# Patient Record
Sex: FEMALE | Race: WHITE | NOT HISPANIC OR LATINO | Employment: OTHER | URBAN - METROPOLITAN AREA
[De-identification: names, ages, dates, MRNs, and addresses within clinical notes are randomized per-mention and may not be internally consistent; named-entity substitution may affect disease eponyms.]

---

## 2017-01-17 ENCOUNTER — TRANSCRIBE ORDERS (OUTPATIENT)
Dept: LAB | Facility: CLINIC | Age: 27
End: 2017-01-17

## 2017-10-01 ENCOUNTER — OFFICE VISIT (OUTPATIENT)
Dept: URGENT CARE | Facility: CLINIC | Age: 27
End: 2017-10-01
Payer: COMMERCIAL

## 2017-10-01 PROCEDURE — 99213 OFFICE O/P EST LOW 20 MIN: CPT

## 2017-10-03 NOTE — PROGRESS NOTES
Assessment  1  History of sinusitis (V12 69) (Z87 09)   2  Sinusitis (473 9) (J32 9)    Plan  Sinusitis    · Amoxicillin-Pot Clavulanate 875-125 MG Oral Tablet (Augmentin); TAKE 1 TABLET  EVERY 12 HOURS DAILY    Discussion/Summary  Discussion Summary:   Discussed dx of sinsuitis will treat with augmentin education to use secondary method of birth control while on antibiotic and instructed to follow up with PCP in 1-2 days  Medication Side Effects Reviewed: Possible side effects of new medications were reviewed with the patient/guardian today  Understands and agrees with treatment plan: The treatment plan was reviewed with the patient/guardian  The patient/guardian understands and agrees with the treatment plan   Counseling Documentation With Imm: The patient was counseled regarding instructions for management,-patient and family education,-importance of compliance with treatment  total time of encounter was 25 [id number] minutes was spent counseling  Follow Up Instructions: Follow Up with your Primary Care Provider in 1-2 days  If your symptoms worsen, go to the nearest [place] [address] Emergency Department  Chief Complaint  1  Cold Symptoms  Chief Complaint Free Text Note Form: c/o facial sinus pressure and post nasal drip x 5 days      History of Present Illness  HPI: 27year old fmeale at [facility] today with chief complaint of sinus pressure and congestion for 5 days denies nay fever chills   Hospital Based Practices Required Assessment:   Pain Assessment   the patient states they do not have pain  The pain is located in the facial  (on a scale of 0 to 10, the patient rates the pain at 8 )   Abuse And Domestic Violence Screen    Yes, the patient is safe at home -The patient states no one is hurting them  Depression And Suicide Screen  No, the patient has not had thoughts of hurting themself  No, the patient has not felt depressed in the past 7 days  Readiness To Learn: Receptive     Barriers To Learning: none  Preferred Learning: verbal   Education Completed: disease/condition,-medications-and-further treatment/follow-up   Teaching Method: verbal   Person Taught: patient   Evaluation Of Learning: verbalized/demonstrated understanding      Review of Systems  Focused-Female:   Constitutional: No fever, no chills, feels well, no tiredness, no recent weight gain or loss  ENT: nasal discharge, but-as noted in HPI  Cardiovascular: no complaints of slow or fast heart rate, no chest pain, no palpitations, no leg claudication or lower extremity edema  Respiratory: PND, but-as noted in HPI  Breasts: no complaints of breast pain, breast lump or nipple discharge  Gastrointestinal: no complaints of abdominal pain, no constipation, no nausea or diarrhea, no vomiting, no bloody stools  Genitourinary: no complaints of dysuria, no incontinence, no pelvic pain, no dysmenorrhea, no vaginal discharge or abnormal vaginal bleeding  Musculoskeletal: no complaints of arthralgia, no myalgia, no joint swelling or stiffness, no limb pain or swelling  Integumentary: no complaints of skin rash or lesion, no itching or dry skin, no skin wounds  Neurological: no complaints of headache, no confusion, no numbness or tingling, no dizziness or fainting  ROS Reviewed:   ROS reviewed  Active Problems  1  Fatigue (780 79) (R53 83)   2  Need for HPV vaccination (V04 89) (Z23)   3  Need for Tdap vaccination (V06 1) (Z23)   4  Oral contraceptive use (V25 41) (Z30 41)    Past Medical History  1  History of Acute tonsillitis (463) (J03 90)   2  History of sinusitis (V12 69) (Z87 09)   3  History of tinea corporis (V12 09) (Z86 19)   4  No pertinent past medical history  Active Problems And Past Medical History Reviewed: The active problems and past medical history were reviewed and updated today  Family History  Family History    1  Family history of cardiac disorder (V17 49) (Z82 49)   2   Family history of cerebrovascular accident (V17 1) (Z82 3)   3  Family history of diabetes mellitus (V18 0) (Z83 3)   4  Family history of hypertension (V17 49) (Z82 49)   5  Family history of malignant neoplasm (V16 9) (Z80 9)  Family History Reviewed: The family history was reviewed and updated today  Social History   · Occasional tobacco smoker (305 1) (Z72 0)   · Oral contraceptive use (V25 41) (Z30 41)  Social History Reviewed: The social history was reviewed and updated today  The social history was reviewed and is unchanged  Surgical History  1  History of Oral Surgery Tooth Extraction  Surgical History Reviewed: The surgical history was reviewed and updated today  Current Meds   1  Multiple Vitamins Oral Tablet; Take 1 daily; Therapy: 40VIU8925 to (Last BS:[id number])  Requested for: 55FQC1366 Ordered  Medication List Reviewed: The medication list was reviewed and updated today  Allergies  1  No Known Drug Allergies  2  Dust   3  Other   4  Shellfish    Vitals  Signs   Recorded: 67DXM5610 10:10AM   Temperature: 98 1 F  Heart Rate: 79  Respiration: 18  Systolic: 510  Diastolic: 78  Height: 5 ft 4 in  Weight: 119 lb   BMI Calculated: 20 43  BSA Calculated: 1 57  O2 Saturation: 98, RA  Pain Scale: 8    Physical Exam    Constitutional   General appearance: No acute distress, well appearing and well nourished  Eyes   Conjunctiva and lids: No swelling, erythema or discharge  Pupils and irises: Equal, round and reactive to light  Ears, Nose, Mouth, and Throat   External inspection of ears and nose: Normal     Otoscopic examination: Tympanic membranes translucent with normal light reflex  Canals patent without erythema  Nasal mucosa, septum, and turbinates: Abnormal   normal nasal septum,-no intranasal masses or polyps-and-normal nasal turbinates  There was a purulent discharge from both nares  The bilateral nasal mucosa was boggy-and-edematous  Oropharynx: Abnormal  -PND     Pulmonary Respiratory effort: No increased work of breathing or signs of respiratory distress  Auscultation of lungs: Clear to auscultation  Cardiovascular   Palpation of heart: Normal PMI, no thrills  Auscultation of heart: Normal rate and rhythm, normal S1 and S2, without murmurs  Lymphatic   Palpation of lymph nodes in neck: No lymphadenopathy      Musculoskeletal   Gait and station: Normal     Psychiatric   Orientation to person, place, and time: Normal     Mood and affect: Normal        Signatures   Electronically signed by : Ronn Reyez NP; Oct  1 2017 10:26AM EST                       (Author)    Electronically signed by : KELVIN Root ; Oct  2 2017 12:09PM EST                       (Co-author)

## 2017-11-06 ENCOUNTER — OFFICE VISIT (OUTPATIENT)
Dept: URGENT CARE | Facility: CLINIC | Age: 27
End: 2017-11-06
Payer: COMMERCIAL

## 2017-11-06 PROCEDURE — 99213 OFFICE O/P EST LOW 20 MIN: CPT

## 2017-11-08 NOTE — PROGRESS NOTES
Assessment  1  Acute frontal sinusitis (461 1) (J01 10)    Plan  Acute frontal sinusitis    · Azithromycin 250 MG Oral Tablet (Zithromax Z-Juan); TAKE 2 TABLETS ON DAY 1  THEN TAKE 1 TABLET A DAY FOR 4 DAYS    Discussion/Summary  Discussion Summary:   Discussed dx of sinusitis and will treat with zithromax and follow up with PCP in 1-2 days  Medication Side Effects Reviewed: Possible side effects of new medications were reviewed with the patient/guardian today  Understands and agrees with treatment plan: The treatment plan was reviewed with the patient/guardian  The patient/guardian understands and agrees with the treatment plan   Counseling Documentation With Imm: The patient was counseled regarding instructions for management,-- patient and family education,-- importance of compliance with treatment  total time of encounter was 25 minutes-- and-- 10 minutes was spent counseling  Follow Up Instructions: Follow Up with your Primary Care Provider in 1-2 days  If your symptoms worsen, go to the nearest [place] [address] Emergency Department  Chief Complaint  1  Sore Throat  Chief Complaint Free Text Note Form: C/O sore throat and post nasal drip since yesterday  History of Present Illness  HPI: 32year old female at urgent care today with chief complaint of sinus congestion PND and sore throat for 2 days  Has not used nay OTC medications   Hospital Based Practices Required Assessment:   Pain Assessment   the patient states they have pain  The pain is located in the throat  The patient describes the pain as aching  (on a scale of 0 to 10, the patient rates the pain at 3 )   Abuse And Domestic Violence Screen    Yes, the patient is safe at home  -- The patient states no one is hurting them  Depression And Suicide Screen  No, the patient has not had thoughts of hurting themself  No, the patient has not felt depressed in the past 7 days  Readiness To Learn: Receptive  Barriers To Learning: none  Preferred Learning: verbal   Education Completed: disease/condition,-- medications-- and-- further treatment/follow-up   Teaching Method: verbal   Person Taught: patient   Evaluation Of Learning: verbalized/demonstrated understanding   Sore Throat: Susy Weinberg presents with complaints of sore throat  Associated symptoms include nasal congestion-- and-- postnasal drainage, but-- no dysphagia,-- no odynophagia,-- no swollen glands,-- no myalgias,-- no drooling,-- no stridor,-- no fever,-- no chills,-- no headache,-- no hoarseness,-- no neck stiffness,-- no ear pain,-- no facial pain,-- no abdominal pain,-- no nausea,-- no vomiting,-- no cough,-- no rash,-- no anorexia-- and-- no fatigue  Review of Systems  Focused-Female:   Constitutional: No fever, no chills, feels well, no tiredness, no recent weight gain or loss  ENT: sore throat-- and-- nasal discharge, but-- as noted in HPI  Cardiovascular: no complaints of slow or fast heart rate, no chest pain, no palpitations, no leg claudication or lower extremity edema  Respiratory: PND, but-- as noted in HPI  Breasts: no complaints of breast pain, breast lump or nipple discharge  Gastrointestinal: no complaints of abdominal pain, no constipation, no nausea or diarrhea, no vomiting, no bloody stools  Genitourinary: no complaints of dysuria, no incontinence, no pelvic pain, no dysmenorrhea, no vaginal discharge or abnormal vaginal bleeding  Musculoskeletal: no complaints of arthralgia, no myalgia, no joint swelling or stiffness, no limb pain or swelling  Integumentary: no complaints of skin rash or lesion, no itching or dry skin, no skin wounds  Neurological: no complaints of headache, no confusion, no numbness or tingling, no dizziness or fainting  ROS Reviewed:   ROS reviewed  Active Problems  1  Fatigue (780 79) (R53 83)   2  Need for HPV vaccination (V04 89) (Z23)   3  Need for Tdap vaccination (V06 1) (Z23)   4   Oral contraceptive use (V25 41) (Z30 41)   5  Sinusitis (473 9) (J32 9)    Past Medical History  1  History of Acute tonsillitis (463) (J03 90)   2  History of sinusitis (V12 69) (Z87 09)   3  History of tinea corporis (V12 09) (Z86 19)   4  No pertinent past medical history  Active Problems And Past Medical History Reviewed: The active problems and past medical history were reviewed and updated today  Family History  Family History    1  Family history of cardiac disorder (V17 49) (Z82 49)   2  Family history of cerebrovascular accident (V17 1) (Z82 3)   3  Family history of diabetes mellitus (V18 0) (Z83 3)   4  Family history of hypertension (V17 49) (Z82 49)   5  Family history of malignant neoplasm (V16 9) (Z80 9)  Family History Reviewed: The family history was reviewed and updated today  Social History   · Denied: History of Occasional tobacco smoker   · Oral contraceptive use (V25 41) (Z30 41)  Social History Reviewed: The social history was reviewed and updated today  The social history was reviewed and is unchanged  Surgical History  1  History of Oral Surgery Tooth Extraction  Surgical History Reviewed: The surgical history was reviewed and updated today  Current Meds   1  Multiple Vitamins Oral Tablet; Take 1 daily; Therapy: 19SXC7558 to (Last VZ:[id number])  Requested for: 06QEC3411 Ordered  Medication List Reviewed: The medication list was reviewed and updated today  Allergies  1  No Known Drug Allergies  2  Dust   3  Other   4  Shellfish    Vitals  Signs   Recorded: 35SED1528 12:18PM   Temperature: 97 3 F  Heart Rate: 84  Respiration: 18  Systolic: 927  Diastolic: 72  Height: 5 ft 4 in  Weight: 119 lb   BMI Calculated: 20 43  BSA Calculated: 1 58  O2 Saturation: 99  Pain Scale: 3    Physical Exam    Constitutional   General appearance: No acute distress, well appearing and well nourished  Eyes   Conjunctiva and lids: No swelling, erythema or discharge      Pupils and irises: Equal, round and reactive to light  Ears, Nose, Mouth, and Throat   External inspection of ears and nose: Normal     Otoscopic examination: Tympanic membranes translucent with normal light reflex  Canals patent without erythema  Nasal mucosa, septum, and turbinates: Abnormal   normal nasal septum,-- no intranasal masses or polyps-- and-- normal nasal turbinates  There was a purulent discharge from both nares  The bilateral nasal mucosa was boggy-- and-- edematous  Oropharynx: Abnormal  -- PND  Pulmonary   Respiratory effort: No increased work of breathing or signs of respiratory distress  Auscultation of lungs: Clear to auscultation  Cardiovascular   Palpation of heart: Normal PMI, no thrills  Auscultation of heart: Normal rate and rhythm, normal S1 and S2, without murmurs  Lymphatic   Palpation of lymph nodes in neck: No lymphadenopathy      Musculoskeletal   Gait and station: Normal     Psychiatric   Orientation to person, place, and time: Normal     Mood and affect: Normal        Signatures   Electronically signed by : Lyle Hurley NP; Nov 6 2017 12:41PM EST                       (Author)    Electronically signed by : KELVIN Sullivan ; Nov 7 2017  4:07PM EST                       (Co-author)

## 2018-04-27 ENCOUNTER — OFFICE VISIT (OUTPATIENT)
Dept: URGENT CARE | Facility: CLINIC | Age: 28
End: 2018-04-27
Payer: COMMERCIAL

## 2018-04-27 VITALS
WEIGHT: 125 LBS | DIASTOLIC BLOOD PRESSURE: 76 MMHG | HEART RATE: 88 BPM | TEMPERATURE: 97.8 F | SYSTOLIC BLOOD PRESSURE: 112 MMHG | HEIGHT: 64 IN | RESPIRATION RATE: 16 BRPM | OXYGEN SATURATION: 98 % | BODY MASS INDEX: 21.34 KG/M2

## 2018-04-27 DIAGNOSIS — M54.9 OTHER ACUTE BACK PAIN: Primary | ICD-10-CM

## 2018-04-27 PROCEDURE — 99213 OFFICE O/P EST LOW 20 MIN: CPT | Performed by: PHYSICIAN ASSISTANT

## 2018-04-27 RX ORDER — METHYLPREDNISOLONE 4 MG/1
TABLET ORAL
Qty: 21 TABLET | Refills: 0 | Status: SHIPPED | OUTPATIENT
Start: 2018-04-27 | End: 2019-01-24

## 2018-04-27 NOTE — PATIENT INSTRUCTIONS
Recommend icing the area of injury every 2 hours for 20 30 minutes  Take steroids as directed  Moist warm heat and stretches  If symptoms are not improving over the next 5-7 days, follow up with your PCP or orthopedic doctor

## 2018-04-27 NOTE — PROGRESS NOTES
[address] [facility]    NAME: Harmony Martin is a 32 y o  female  : 1990    MRN: [id number]  DATE: 2018  TIME: 10:46 AM    Assessment and Plan   Other acute back pain [M54 9]  1  Other acute back pain  methylprednisolone (MEDROL) 4 mg tablet    Clinically feel this is all more muscular in nature and muscle spasm muscle strain in the midback  Will treat with prednisone  If not improving use to follow up with PCP  Patient Instructions     Patient Instructions   Recommend icing the area of injury every 2 hours for 20 30 minutes  Take steroids as directed  Moist warm heat and stretches  If symptoms are not improving over the next 5-7 days, follow up with your PCP or orthopedic doctor  Chief Complaint     Chief Complaint   Patient presents with    Back Pain     C/O mid upper back pain x 1 week with no known injury  Pt did drive to [place] and back  Pt noted a swollen lymph node on the left occiptal area last PM and c/o all over body aches  History of Present Illness   [de-identified] year female here with complaint of mid back pain has been there for about a week  She thought that she had pulled something in it just was not getting better  Yesterday noticed some pain in left occiput of her skull  But she felt a bump there that she is unsure if the lymph node or not  Also feels achy all over  Denies any fever chills  No upper respiratory complaints, except for some mild nasal congestion which he relates to her allergies     No cough  Denies any radiation of the pain  Denies any related numbness or tingling or weakness in her extremities  Review of Systems   Review of Systems   Constitutional: Negative for activity change, appetite change, chills, diaphoresis, fatigue, fever and unexpected weight change  HENT: Negative for congestion, dental problem, hearing loss, sinus pressure, sneezing, sore throat, tinnitus, trouble swallowing and voice change      Eyes: Negative for photophobia, redness and visual disturbance  Respiratory: Negative for apnea, cough, chest tightness, shortness of breath, wheezing and stridor  Cardiovascular: Negative for chest pain, palpitations and leg swelling  Gastrointestinal: Negative for abdominal distention, abdominal pain, blood in stool, constipation, diarrhea, nausea and vomiting  Endocrine: Negative for cold intolerance, heat intolerance, polydipsia, polyphagia and polyuria  Genitourinary: Negative for difficulty urinating, dysuria, flank pain, frequency, hematuria and urgency  Musculoskeletal: Positive for back pain  Negative for arthralgias, gait problem, joint swelling, myalgias, neck pain and neck stiffness  Skin: Negative for pallor, rash and wound  Neurological: Negative for dizziness, tremors, seizures, speech difficulty, weakness and headaches  Hematological: Negative for adenopathy  Does not bruise/bleed easily  Psychiatric/Behavioral: Negative for agitation, confusion, dysphoric mood and sleep disturbance  The patient is not nervous/anxious  All other systems reviewed and are negative  Current Medications     Current Outpatient Prescriptions:     MULTIPLE VITAMINS PO, Take by mouth daily, Disp: , Rfl:     methylprednisolone (MEDROL) 4 mg tablet, Medrol dosepak, take as directed, Disp: 21 tablet, Rfl: 0    Current Allergies     Allergies as of 04/27/2018 - Reviewed 04/27/2018   Allergen Reaction Noted    Other  06/18/2014    Shellfish allergy  06/18/2014          The following portions of the patient's history were reviewed and updated as appropriate: allergies, current medications, past family history, past medical history, past social history, past surgical history and problem list    Past Medical History:   Diagnosis Date    Allergic     Dust allergy     Gestational diabetes     Shellfish allergy      History reviewed  No pertinent surgical history  No family history on file    Medications have been verified  Objective   /76   Pulse 88   Temp 97 8 °F (36 6 °C) (Tympanic)   Resp 16   Ht 5' 4" (1 626 m)   Wt 56 7 kg (125 lb)   LMP 04/11/2018   SpO2 98%   BMI 21 46 kg/m²      Physical Exam   Physical Exam   Constitutional: She appears well-developed and well-nourished  No distress  HENT:   Head: Normocephalic  Right Ear: External ear normal    Left Ear: External ear normal    Nose: Nose normal    Mouth/Throat: Oropharynx is clear and moist  No oropharyngeal exudate  Neck: Normal range of motion  Neck supple  Cardiovascular: Normal rate, regular rhythm and normal heart sounds  No murmur heard  Pulmonary/Chest: Effort normal and breath sounds normal  No respiratory distress  She has no wheezes  She has no rales  Abdominal: Soft  Bowel sounds are normal  There is no tenderness  Musculoskeletal: Normal range of motion  Cervical back: She exhibits tenderness (over left occiput area with swelling  no mass noted), bony tenderness and spasm  She exhibits normal range of motion  Thoracic back: She exhibits tenderness and spasm  She exhibits normal range of motion  Back:    Lymphadenopathy:     She has no cervical adenopathy  Skin: Skin is warm  No rash noted

## 2018-08-20 ENCOUNTER — OFFICE VISIT (OUTPATIENT)
Dept: URGENT CARE | Facility: CLINIC | Age: 28
End: 2018-08-20
Payer: COMMERCIAL

## 2018-08-20 VITALS
TEMPERATURE: 98.9 F | RESPIRATION RATE: 20 BRPM | HEIGHT: 64 IN | WEIGHT: 125 LBS | BODY MASS INDEX: 21.34 KG/M2 | HEART RATE: 84 BPM | DIASTOLIC BLOOD PRESSURE: 60 MMHG | OXYGEN SATURATION: 99 % | SYSTOLIC BLOOD PRESSURE: 108 MMHG

## 2018-08-20 DIAGNOSIS — R05.9 COUGH: Primary | ICD-10-CM

## 2018-08-20 PROCEDURE — 99213 OFFICE O/P EST LOW 20 MIN: CPT

## 2018-08-20 NOTE — PROGRESS NOTES
[address] [facility]        NAME: Isaac Gonzalez is a 32 y o  female  : 1990    MRN: [id number]  DATE: 2018  TIME: 10:23 AM    Assessment and Plan   Cough [R05]  1  Cough           Patient Instructions     Cough  Over the counter dayquil  Increase fluid intake  Follow up with PCP in 3-5 days  Proceed to  ER if symptoms worsen  Chief Complaint     Chief Complaint   Patient presents with    Cold Like Symptoms     chills, fever generalized aches started Saturday         History of Present Illness       31 y/o female c/o cough, stuffy nose and body aches x 2 days  Denies chest pain, SOB, n/v, diaphoresis  States she did not take over the counter medications        Review of Systems   Review of Systems   Constitutional: Negative  HENT: Negative  Eyes: Negative  Respiratory: Positive for cough  Negative for apnea, choking, chest tightness, shortness of breath, wheezing and stridor  Cardiovascular: Negative  Current Medications       Current Outpatient Prescriptions:     MULTIPLE VITAMINS PO, Take by mouth daily, Disp: , Rfl:     methylprednisolone (MEDROL) 4 mg tablet, Medrol dosepak, take as directed (Patient not taking: Reported on 2018 ), Disp: 21 tablet, Rfl: 0    Current Allergies     Allergies as of 2018 - Reviewed 2018   Allergen Reaction Noted    Other  2014    Shellfish allergy  2014            The following portions of the patient's history were reviewed and updated as appropriate: allergies, current medications, past family history, past medical history, past social history, past surgical history and problem list      Past Medical History:   Diagnosis Date    Allergic     Dust allergy     Gestational diabetes     Shellfish allergy        History reviewed  No pertinent surgical history  No family history on file  Medications have been verified          Objective   /60   Pulse 84   Temp 98 9 °F (37 2 °C) (Tympanic) Resp 20   Ht 5' 4" (1 626 m)   Wt 56 7 kg (125 lb)   LMP 08/06/2018 (Approximate)   SpO2 99%   BMI 21 46 kg/m²        Physical Exam     Physical Exam   Constitutional: She appears well-developed and well-nourished  No distress  HENT:   Head: Normocephalic and atraumatic  Right Ear: Hearing, tympanic membrane, external ear and ear canal normal    Left Ear: Hearing, tympanic membrane, external ear and ear canal normal    Mouth/Throat: Uvula is midline and mucous membranes are normal  Posterior oropharyngeal erythema present  No oropharyngeal exudate, posterior oropharyngeal edema or tonsillar abscesses  Cardiovascular: Normal rate, regular rhythm, normal heart sounds and intact distal pulses  Pulmonary/Chest: Effort normal and breath sounds normal    Skin: She is not diaphoretic

## 2018-08-20 NOTE — PATIENT INSTRUCTIONS
Cough  Over the counter dayquil  Increase fluid intake  Follow up with PCP in 3-5 days  Proceed to  ER if symptoms worsen  Acute Hemoptysis   WHAT YOU SHOULD KNOW:   Acute hemoptysis is sudden coughing or spitting up of blood  This occurs when blood vessels in your airway or lungs weaken or break, and begin to bleed  You may bleed in small or large amounts that appear in your sputum (spit)  Sometimes, bleeding from other areas, such as the nose, mouth, or throat, cause people to cough or spit up blood  AFTER YOU LEAVE:   Medicines:  · Antibiotics: This medicine may be given to fight or prevent an infection caused by bacteria  Always take your antibiotics exactly as ordered by your healthcare provider  Do not stop taking your medicine unless directed by your healthcare provider  Never save antibiotics or take leftover antibiotics that were given to you for another illness  · Antitussives: These medicines help control or stop your cough  · Take your medicine as directed  Call your healthcare provider if you think your medicine is not helping or if you have side effects  Tell him if you are allergic to any medicine  Keep a list of the medicines, vitamins, and herbs you take  Include the amounts, and when and why you take them  Bring the list or the pill bottles to follow-up visits  Carry your medicine list with you in case of an emergency  Follow up with your healthcare provider in 2 days or as directed: You may need frequent visits to monitor your condition and prevent further blood loss  Write down your questions so you remember to ask them during your visits  Do not take herbal medicines:  Herbal supplements increase your risk of bleeding  Examples are garlic, gingko, and ginseng  Do not smoke, and do not go to smoky areas:  Smoke may worsen your hemoptysis  If you smoke, it is never too late to quit   You are more likely to have heart disease, lung disease, cancer, and other health problems if you smoke  Stop smoking to improve your health and the health of those around you  If you smoke, ask for information about how to stop  Contact your healthcare provider if:   · You have new or increased shortness of breath  · You have a fever  · You lose weight without trying  · You feel more weak and tired than usual      · You have a cough that does not improve or gets worse  · You have questions or concerns about your condition or care  Seek care immediately or call 911 if:   · You have new or worse chest pain or shortness of breath  · Your bleeding gets worse or you cough a large amount of blood (more than 1 tablespoon)  · You cannot stop vomiting  · You are so dizzy that you think you may fall or you faint  · You have pain or swelling in your legs  · Your legs and arms feel cold or look pale  © 2014 [address] [employer] is for End User's use only and may not be sold, redistributed or otherwise used for commercial purposes  All illustrations and images included in CareNotes® are the copyrighted property of [employer]  or Isaiah Navarrete  The above information is an  only  It is not intended as medical advice for individual conditions or treatments  Talk to your doctor, nurse or pharmacist before following any medical regimen to see if it is safe and effective for you

## 2019-01-24 ENCOUNTER — OFFICE VISIT (OUTPATIENT)
Dept: URGENT CARE | Facility: CLINIC | Age: 29
End: 2019-01-24
Payer: COMMERCIAL

## 2019-01-24 VITALS
WEIGHT: 136 LBS | SYSTOLIC BLOOD PRESSURE: 118 MMHG | TEMPERATURE: 97.5 F | RESPIRATION RATE: 20 BRPM | DIASTOLIC BLOOD PRESSURE: 62 MMHG | BODY MASS INDEX: 23.34 KG/M2 | OXYGEN SATURATION: 98 % | HEART RATE: 112 BPM

## 2019-01-24 DIAGNOSIS — J01.90 ACUTE SINUSITIS, RECURRENCE NOT SPECIFIED, UNSPECIFIED LOCATION: Primary | ICD-10-CM

## 2019-01-24 PROCEDURE — 99213 OFFICE O/P EST LOW 20 MIN: CPT | Performed by: PHYSICIAN ASSISTANT

## 2019-01-24 RX ORDER — AMOXICILLIN AND CLAVULANATE POTASSIUM 875; 125 MG/1; MG/1
1 TABLET, FILM COATED ORAL EVERY 12 HOURS SCHEDULED
Qty: 20 TABLET | Refills: 0 | Status: SHIPPED | OUTPATIENT
Start: 2019-01-24 | End: 2019-02-03

## 2019-01-24 NOTE — PROGRESS NOTES
[facility]        NAME: Fuad Castano is a 25 y o  female  : 1990    MRN: [id number]  DATE: 2019  TIME: 12:14 PM    Assessment and Plan   Acute sinusitis, recurrence not specified, unspecified location [J01 90]  1  Acute sinusitis, recurrence not specified, unspecified location  amoxicillin-clavulanate (AUGMENTIN) 875-125 mg per tablet      Follow up with OBGYN on allergy medication use/regimen during pregnancy  May use mucinex OTC     Patient Instructions       Follow up with PCP in 3-5 days  Proceed to  ER if symptoms worsen  Chief Complaint     Chief Complaint   Patient presents with    Cold Like Symptoms     C/O sinus congestion, post nasal drip, sore throat x 2 months Pt is 24 weeks pregnant  History of Present Illness       75-year-old female presents with sinus congestion, postnasal drip for 2 months  Patient is 6 months pregnant  She states she has not taken anything for the symptoms  She states she suffers from size infections often  Unsure if she has seasonal allergies  She states the symptoms are worse at night and she is having difficulty breathing out of her nose  She denies fever, chills, shortness of breath  Review of Systems   Review of Systems   Constitutional: Negative for chills, fatigue and fever  HENT: Positive for congestion, postnasal drip and sinus pain  Negative for ear pain, sore throat and trouble swallowing  Eyes: Negative for pain, discharge and redness  Respiratory: Positive for cough  Negative for chest tightness, shortness of breath and wheezing  Cardiovascular: Negative for chest pain, palpitations and leg swelling  Gastrointestinal: Negative for abdominal pain, diarrhea, nausea and vomiting  Musculoskeletal: Negative for arthralgias, joint swelling and myalgias  Skin: Negative for rash  Neurological: Negative for dizziness, weakness, numbness and headaches           Current Medications       Current Outpatient Prescriptions:     amoxicillin-clavulanate (AUGMENTIN) 875-125 mg per tablet, Take 1 tablet by mouth every 12 (twelve) hours for 10 days, Disp: 20 tablet, Rfl: 0    MULTIPLE VITAMINS PO, Take by mouth daily, Disp: , Rfl:     Current Allergies     Allergies as of 01/24/2019 - Reviewed 01/24/2019   Allergen Reaction Noted    Other  06/18/2014    Shellfish allergy  06/18/2014            The following portions of the patient's history were reviewed and updated as appropriate: allergies, current medications, past family history, past medical history, past social history, past surgical history and problem list      Past Medical History:   Diagnosis Date    Allergic     Dust allergy     Gestational diabetes     Shellfish allergy        History reviewed  No pertinent surgical history  No family history on file  Medications have been verified  Objective   /62   Pulse (!) 112   Temp 97 5 °F (36 4 °C) (Tympanic)   Resp 20   Wt 61 7 kg (136 lb)   LMP 08/24/2018   SpO2 98%   BMI 23 34 kg/m²        Physical Exam     Physical Exam   Constitutional: She is oriented to person, place, and time  She appears well-developed and well-nourished  No distress  HENT:   Head: Normocephalic  Right Ear: Tympanic membrane and external ear normal    Left Ear: Tympanic membrane and external ear normal    Nose: Rhinorrhea present  Right sinus exhibits frontal sinus tenderness  Left sinus exhibits frontal sinus tenderness  Mouth/Throat: Uvula is midline and mucous membranes are normal  Posterior oropharyngeal erythema present  Eyes: Pupils are equal, round, and reactive to light  Conjunctivae and EOM are normal    Neck: Normal range of motion  Neck supple  Cardiovascular: Normal rate, regular rhythm and normal heart sounds  No murmur heard  Pulmonary/Chest: Effort normal and breath sounds normal  No respiratory distress  She has no wheezes  Abdominal: Soft   Bowel sounds are normal  There is no tenderness  Musculoskeletal: Normal range of motion  Lymphadenopathy:     She has no cervical adenopathy  Neurological: She is alert and oriented to person, place, and time  She has normal reflexes  Skin: Skin is warm and dry  Psychiatric: She has a normal mood and affect  Nursing note and vitals reviewed

## 2019-12-22 ENCOUNTER — HOSPITAL ENCOUNTER (EMERGENCY)
Facility: HOSPITAL | Age: 29
Discharge: HOME | End: 2019-12-22
Attending: EMERGENCY MEDICINE
Payer: COMMERCIAL

## 2019-12-22 ENCOUNTER — APPOINTMENT (EMERGENCY)
Dept: CT IMAGING | Facility: HOSPITAL | Age: 29
End: 2019-12-22
Payer: COMMERCIAL

## 2019-12-22 VITALS
BODY MASS INDEX: 24.03 KG/M2 | WEIGHT: 140 LBS | RESPIRATION RATE: 18 BRPM | HEART RATE: 96 BPM | OXYGEN SATURATION: 100 % | SYSTOLIC BLOOD PRESSURE: 123 MMHG | DIASTOLIC BLOOD PRESSURE: 70 MMHG

## 2019-12-22 DIAGNOSIS — E86.0 DEHYDRATION: ICD-10-CM

## 2019-12-22 DIAGNOSIS — R10.84 GENERALIZED ABDOMINAL PAIN: ICD-10-CM

## 2019-12-22 DIAGNOSIS — R11.2 NON-INTRACTABLE VOMITING WITH NAUSEA, UNSPECIFIED VOMITING TYPE: Primary | ICD-10-CM

## 2019-12-22 DIAGNOSIS — R19.7 DIARRHEA, UNSPECIFIED TYPE: ICD-10-CM

## 2019-12-22 LAB
ALBUMIN SERPL BCP-MCNC: 4.6 G/DL (ref 3.5–5.7)
ALP SERPL-CCNC: 51 U/L (ref 40–150)
ALT SERPL W P-5'-P-CCNC: 28 U/L (ref 7–52)
ANION GAP SERPL CALCULATED.3IONS-SCNC: 16 MMOL/L (ref 4–13)
AST SERPL W P-5'-P-CCNC: 23 U/L (ref 13–39)
BACTERIA UR QL AUTO: ABNORMAL /HPF
BASOPHILS # BLD AUTO: 0 THOUSANDS/ΜL (ref 0–0.1)
BASOPHILS NFR BLD AUTO: 0 % (ref 0–2)
BILIRUB SERPL-MCNC: 0.4 MG/DL (ref 0.2–1)
BILIRUB UR QL STRIP: NEGATIVE
BUN SERPL-MCNC: 8 MG/DL (ref 7–25)
CALCIUM SERPL-MCNC: 9.3 MG/DL (ref 8.6–10.5)
CHLORIDE SERPL-SCNC: 106 MMOL/L (ref 98–107)
CLARITY UR: ABNORMAL
CO2 SERPL-SCNC: 17 MMOL/L (ref 21–31)
COLOR UR: YELLOW
CREAT SERPL-MCNC: 0.59 MG/DL (ref 0.6–1.2)
EOSINOPHIL # BLD AUTO: 0 THOUSAND/ΜL (ref 0–0.61)
EOSINOPHIL NFR BLD AUTO: 0 % (ref 0–5)
ERYTHROCYTE [DISTWIDTH] IN BLOOD BY AUTOMATED COUNT: 12.9 % (ref 11.5–14.5)
EXT PREG TEST URINE: NEGATIVE
EXT. CONTROL ED NAV: NORMAL
GFR SERPL CREATININE-BSD FRML MDRD: 123 ML/MIN/1.73SQ M
GLUCOSE SERPL-MCNC: 124 MG/DL (ref 65–99)
GLUCOSE UR STRIP-MCNC: NEGATIVE MG/DL
HCT VFR BLD AUTO: 36.3 % (ref 42–47)
HGB BLD-MCNC: 12.2 G/DL (ref 12–16)
HGB UR QL STRIP.AUTO: NEGATIVE
KETONES UR STRIP-MCNC: ABNORMAL MG/DL
LEUKOCYTE ESTERASE UR QL STRIP: NEGATIVE
LIPASE SERPL-CCNC: <10 U/L (ref 11–82)
LYMPHOCYTES # BLD AUTO: 2.3 THOUSANDS/ΜL (ref 0.6–4.47)
LYMPHOCYTES NFR BLD AUTO: 14 % (ref 21–51)
MCH RBC QN AUTO: 28.7 PG (ref 26–34)
MCHC RBC AUTO-ENTMCNC: 33.7 G/DL (ref 31–37)
MCV RBC AUTO: 85 FL (ref 81–99)
MONOCYTES # BLD AUTO: 0.6 THOUSAND/ΜL (ref 0.17–1.22)
MONOCYTES NFR BLD AUTO: 4 % (ref 2–12)
NEUTROPHILS # BLD AUTO: 13.4 THOUSANDS/ΜL (ref 1.4–6.5)
NEUTS SEG NFR BLD AUTO: 82 % (ref 42–75)
NITRITE UR QL STRIP: NEGATIVE
NON-SQ EPI CELLS URNS QL MICRO: ABNORMAL /HPF
PH UR STRIP.AUTO: 8.5 [PH]
PLATELET # BLD AUTO: 378 THOUSANDS/UL (ref 149–390)
PMV BLD AUTO: 7.5 FL (ref 8.6–11.7)
POTASSIUM SERPL-SCNC: 3.1 MMOL/L (ref 3.5–5.5)
PROT SERPL-MCNC: 7.7 G/DL (ref 6.4–8.9)
PROT UR STRIP-MCNC: ABNORMAL MG/DL
RBC # BLD AUTO: 4.27 MILLION/UL (ref 3.9–5.2)
RBC #/AREA URNS AUTO: ABNORMAL /HPF
SODIUM SERPL-SCNC: 139 MMOL/L (ref 134–143)
SP GR UR STRIP.AUTO: 1.01 (ref 1–1.03)
UROBILINOGEN UR QL STRIP.AUTO: 0.2 E.U./DL
WBC # BLD AUTO: 16.4 THOUSAND/UL (ref 4.8–10.8)
WBC #/AREA URNS AUTO: ABNORMAL /HPF

## 2019-12-22 PROCEDURE — 85025 COMPLETE CBC W/AUTO DIFF WBC: CPT | Performed by: EMERGENCY MEDICINE

## 2019-12-22 PROCEDURE — 74177 CT ABD & PELVIS W/CONTRAST: CPT

## 2019-12-22 PROCEDURE — 81025 URINE PREGNANCY TEST: CPT | Performed by: EMERGENCY MEDICINE

## 2019-12-22 PROCEDURE — 99285 EMERGENCY DEPT VISIT HI MDM: CPT

## 2019-12-22 PROCEDURE — 83690 ASSAY OF LIPASE: CPT | Performed by: EMERGENCY MEDICINE

## 2019-12-22 PROCEDURE — 99284 EMERGENCY DEPT VISIT MOD MDM: CPT | Performed by: EMERGENCY MEDICINE

## 2019-12-22 PROCEDURE — 99285 EMERGENCY DEPT VISIT HI MDM: CPT | Performed by: EMERGENCY MEDICINE

## 2019-12-22 PROCEDURE — 96374 THER/PROPH/DIAG INJ IV PUSH: CPT

## 2019-12-22 PROCEDURE — 96361 HYDRATE IV INFUSION ADD-ON: CPT

## 2019-12-22 PROCEDURE — 36415 COLL VENOUS BLD VENIPUNCTURE: CPT | Performed by: EMERGENCY MEDICINE

## 2019-12-22 PROCEDURE — 81001 URINALYSIS AUTO W/SCOPE: CPT | Performed by: EMERGENCY MEDICINE

## 2019-12-22 PROCEDURE — 96376 TX/PRO/DX INJ SAME DRUG ADON: CPT

## 2019-12-22 PROCEDURE — 93005 ELECTROCARDIOGRAM TRACING: CPT

## 2019-12-22 PROCEDURE — 96375 TX/PRO/DX INJ NEW DRUG ADDON: CPT

## 2019-12-22 PROCEDURE — 80053 COMPREHEN METABOLIC PANEL: CPT | Performed by: EMERGENCY MEDICINE

## 2019-12-22 RX ORDER — ONDANSETRON 4 MG/1
4 TABLET, ORALLY DISINTEGRATING ORAL EVERY 6 HOURS PRN
Qty: 10 TABLET | Refills: 0 | Status: SHIPPED | OUTPATIENT
Start: 2019-12-22 | End: 2020-01-20 | Stop reason: ALTCHOICE

## 2019-12-22 RX ORDER — LORAZEPAM 2 MG/ML
1 INJECTION INTRAMUSCULAR ONCE
Status: COMPLETED | OUTPATIENT
Start: 2019-12-22 | End: 2019-12-22

## 2019-12-22 RX ORDER — AZITHROMYCIN 500 MG/1
500 TABLET, FILM COATED ORAL DAILY
Qty: 5 TABLET | Refills: 0 | Status: SHIPPED | OUTPATIENT
Start: 2019-12-22 | End: 2019-12-27

## 2019-12-22 RX ORDER — POTASSIUM CHLORIDE 20 MEQ/1
40 TABLET, EXTENDED RELEASE ORAL ONCE
Status: COMPLETED | OUTPATIENT
Start: 2019-12-22 | End: 2019-12-22

## 2019-12-22 RX ORDER — ONDANSETRON 2 MG/ML
4 INJECTION INTRAMUSCULAR; INTRAVENOUS ONCE
Status: COMPLETED | OUTPATIENT
Start: 2019-12-22 | End: 2019-12-22

## 2019-12-22 RX ORDER — ONDANSETRON 2 MG/ML
1 INJECTION INTRAMUSCULAR; INTRAVENOUS ONCE
Status: COMPLETED | OUTPATIENT
Start: 2019-12-22 | End: 2019-12-22

## 2019-12-22 RX ORDER — KETOROLAC TROMETHAMINE 30 MG/ML
30 INJECTION, SOLUTION INTRAMUSCULAR; INTRAVENOUS ONCE
Status: COMPLETED | OUTPATIENT
Start: 2019-12-22 | End: 2019-12-22

## 2019-12-22 RX ORDER — LORAZEPAM 2 MG/ML
0.5 INJECTION INTRAMUSCULAR ONCE
Status: COMPLETED | OUTPATIENT
Start: 2019-12-22 | End: 2019-12-22

## 2019-12-22 RX ADMIN — LORAZEPAM 0.5 MG: 2 INJECTION INTRAMUSCULAR; INTRAVENOUS at 17:28

## 2019-12-22 RX ADMIN — ONDANSETRON 4 MG: 2 INJECTION INTRAMUSCULAR; INTRAVENOUS at 20:18

## 2019-12-22 RX ADMIN — KETOROLAC TROMETHAMINE 30 MG: 30 INJECTION, SOLUTION INTRAMUSCULAR; INTRAVENOUS at 17:27

## 2019-12-22 RX ADMIN — IOHEXOL 100 ML: 350 INJECTION, SOLUTION INTRAVENOUS at 18:49

## 2019-12-22 RX ADMIN — ONDANSETRON 4 MG: 2 INJECTION INTRAMUSCULAR; INTRAVENOUS at 17:26

## 2019-12-22 RX ADMIN — SODIUM CHLORIDE 2000 ML: 0.9 INJECTION, SOLUTION INTRAVENOUS at 17:24

## 2019-12-22 RX ADMIN — POTASSIUM CHLORIDE 40 MEQ: 1500 TABLET, EXTENDED RELEASE ORAL at 19:07

## 2019-12-22 NOTE — ED PROVIDER NOTES
History  Chief Complaint   Patient presents with    Vomiting     ANXIETY  nUMBNESS AND CRAMPING OF HANDS     Patient is a 68-year-old female  She was at a wedding  She was hung over today  When she started driving home she is very nauseated started to have vomiting  There is not bilious  It was not bloody  She does report feeling not well before the wedding  She also had some diarrhea  She does report some right-sided abdominal pain  She is 7 months postpartum  During the vomiting she became anxious  She began to hyperventilate  She started experiencing bilateral tingling and facial tingling  This developed into carpopedal spasm  She became dizzy  She never passed out  She did receive Ativan and Zofran prior to arrival   The carpopedal spasm improved  She still is very nauseated  Symptoms are moderately severe  Prior to Admission Medications   Prescriptions Last Dose Informant Patient Reported? Taking? MULTIPLE VITAMINS PO   Yes No   Sig: Take by mouth daily      Facility-Administered Medications: None       Past Medical History:   Diagnosis Date    Allergic     Dust allergy     Gestational diabetes     Shellfish allergy        History reviewed  No pertinent surgical history  History reviewed  No pertinent family history  I have reviewed and agree with the history as documented  Social History     Tobacco Use    Smoking status: Never Smoker    Smokeless tobacco: Never Used   Substance Use Topics    Alcohol use: Yes     Comment: DRANK ALOT LAST PM    Drug use: No        Review of Systems   Constitutional: Negative for chills and fever  HENT: Negative for rhinorrhea and sore throat  Eyes: Negative for pain, redness and visual disturbance  Respiratory: Negative for cough and shortness of breath  Cardiovascular: Negative for chest pain and leg swelling  Gastrointestinal: Positive for abdominal pain, diarrhea, nausea and vomiting     Endocrine: Negative for polydipsia and polyuria  Genitourinary: Negative for dysuria, frequency, hematuria, vaginal bleeding and vaginal discharge  Musculoskeletal: Negative for back pain and neck pain  Skin: Negative for rash and wound  Allergic/Immunologic: Negative for immunocompromised state  Neurological: Positive for dizziness and numbness  Negative for weakness and headaches  Hematological: Does not bruise/bleed easily  Psychiatric/Behavioral: Negative for hallucinations and suicidal ideas  The patient is nervous/anxious  All other systems reviewed and are negative  Physical Exam  Physical Exam   Constitutional: She is oriented to person, place, and time  She appears well-developed and well-nourished  Patient is nauseated  HENT:   Head: Normocephalic and atraumatic  Mouth/Throat: Oropharynx is clear and moist    Eyes: Conjunctivae are normal  Right eye exhibits no discharge  Left eye exhibits no discharge  No scleral icterus  Neck: Normal range of motion  Neck supple  Cardiovascular: Normal rate, regular rhythm, normal heart sounds and intact distal pulses  Exam reveals no gallop and no friction rub  No murmur heard  Pulmonary/Chest: Effort normal and breath sounds normal  No stridor  No respiratory distress  She has no wheezes  She has no rales  Abdominal: Soft  Bowel sounds are normal  She exhibits no distension  There is tenderness  There is no rebound and no guarding  Patient has a moderate amount of right lower quadrant tenderness without peritoneal signs  Musculoskeletal: Normal range of motion  She exhibits no edema, tenderness or deformity  No CVA tenderness  No calf tenderness  Neurological: She is alert and oriented to person, place, and time  She has normal strength  No sensory deficit  GCS eye subscore is 4  GCS verbal subscore is 5  GCS motor subscore is 6  Skin: Skin is warm and dry  No rash noted  She is not diaphoretic  Psychiatric: She has a normal mood and affect  Vitals reviewed  Vital Signs  ED Triage Vitals [12/22/19 1619]   Temp Pulse Respirations Blood Pressure SpO2   -- (!) 10 20 126/91 100 %      Temp src Heart Rate Source Patient Position - Orthostatic VS BP Location FiO2 (%)   -- -- -- Right arm --      Pain Score       No Pain           Vitals:    12/22/19 1619   BP: 126/91   Pulse: (!) 10         Visual Acuity      ED Medications  Medications   sodium chloride 0 9 % bolus 2,000 mL (has no administration in time range)   ondansetron (ZOFRAN) injection 4 mg (has no administration in time range)   ketorolac (TORADOL) injection 30 mg (has no administration in time range)   LORazepam (ATIVAN) 2 mg/mL injection 0 5 mg (has no administration in time range)   LORazepam (FOR EMS ONLY) (ATIVAN) 2 mg/mL injection 2 mg (0 mg Does not apply Given to EMS 12/22/19 1633)   ondansetron (FOR EMS ONLY) (ZOFRAN) 4 mg/2 mL injection 4 mg (0 mg Does not apply Given to EMS 12/22/19 1633)       Diagnostic Studies  Results Reviewed     Procedure Component Value Units Date/Time    CBC and differential [[id number]]     Lab Status:  No result Specimen:  Blood     Comprehensive metabolic panel [[id number]]     Lab Status:  No result Specimen:  Blood     Lipase [[id number]]     Lab Status:  No result Specimen:  Blood     UA w Reflex to Microscopic w Reflex to Culture [[id number]]     Lab Status:  No result Specimen:  Urine     POCT pregnancy, urine [[id number]]     Lab Status:  No result                  CT abdomen pelvis with contrast    (Results Pending)              Procedures  ECG 12 Lead Documentation Only  Date/Time: 12/22/2019 6:37 PM  Performed by: Dang Sullivan MD  Authorized by: Dang Sullivan MD     ECG reviewed by me, the ED Provider: yes    Patient location:  ED  Comments:      Normal sinus rhythm  Poor R-wave progression  No acute ischemic ST or T-wave abnormality    No arrythmia             ED Course                               MDM      Disposition  Final diagnoses: None     ED Disposition     None      Follow-up Information    None         Patient's Medications   Discharge Prescriptions    No medications on file     No discharge procedures on file      ED Provider  Electronically Signed by           Sherry Lewis MD  12/23/19 [id number]

## 2019-12-23 LAB
ATRIAL RATE: 84 BPM
P AXIS: 53 DEGREES
PR INTERVAL: 178 MS
QRS AXIS: 50 DEGREES
QRSD INTERVAL: 88 MS
QT INTERVAL: 390 MS
QTC INTERVAL: 460 MS
T WAVE AXIS: 42 DEGREES
VENTRICULAR RATE: 84 BPM

## 2019-12-23 PROCEDURE — 93010 ELECTROCARDIOGRAM REPORT: CPT | Performed by: INTERNAL MEDICINE

## 2019-12-23 NOTE — ED PROVIDER NOTES
History  Chief Complaint   Patient presents with    Vomiting     ANXIETY  nUMBNESS AND CRAMPING OF HANDS     Received in sign-out  Patient here with nausea and vomiting and abdominal pain  CT scan pending at the time of sign    Labs reviewed  Patient had leukocytosis, otherwise unremarkable    Patient seen and evaluated and interviewed  I agree with the assessment      Vomiting   Severity:  Moderate  Timing:  Intermittent  Progression:  Unchanged  Chronicity:  New  Recent urination:  Normal  Relieved by:  Nothing  Worsened by:  Nothing  Associated symptoms: abdominal pain and diarrhea    Associated symptoms: no arthralgias, no chills, no cough, no fever, no myalgias and no URI    Risk factors: alcohol use    Risk factors: no diabetes, not pregnant and no prior abdominal surgery        Prior to Admission Medications   Prescriptions Last Dose Informant Patient Reported? Taking? MULTIPLE VITAMINS PO   Yes No   Sig: Take by mouth daily      Facility-Administered Medications: None       Past Medical History:   Diagnosis Date    Allergic     Dust allergy     Gestational diabetes     Shellfish allergy        History reviewed  No pertinent surgical history  History reviewed  No pertinent family history  I have reviewed and agree with the history as documented  Social History     Tobacco Use    Smoking status: Never Smoker    Smokeless tobacco: Never Used   Substance Use Topics    Alcohol use: Yes     Comment: DRANK ALOT LAST PM    Drug use: No        Review of Systems   Constitutional: Negative for chills and fever  Respiratory: Negative for cough  Gastrointestinal: Positive for abdominal pain, diarrhea and vomiting  Musculoskeletal: Negative for arthralgias and myalgias  All other systems reviewed and are negative  Physical Exam  Physical Exam   Constitutional: She is oriented to person, place, and time  HENT:   Head: Normocephalic and atraumatic     Eyes: Pupils are equal, round, and reactive to light  Conjunctivae are normal    Neck: Normal range of motion  Neck supple  No JVD present  Pulmonary/Chest: Effort normal  No respiratory distress  Abdominal: Soft  She exhibits no distension and no mass  There is no tenderness  There is no rebound and no guarding  No hernia  Musculoskeletal: Normal range of motion  She exhibits no edema  Lymphadenopathy:     She has no cervical adenopathy  Neurological: She is alert and oriented to person, place, and time  No cranial nerve deficit  She exhibits normal muscle tone  Psychiatric: She has a normal mood and affect   Her behavior is normal        Vital Signs  ED Triage Vitals   Temp Pulse Respirations Blood Pressure SpO2   -- 12/22/19 1619 12/22/19 1619 12/22/19 1619 12/22/19 1619    84 20 126/91 100 %      Temp src Heart Rate Source Patient Position - Orthostatic VS BP Location FiO2 (%)   -- 12/22/19 2132 -- 12/22/19 1619 --    Monitor  Right arm       Pain Score       12/22/19 1619       No Pain           Vitals:    12/22/19 1619 12/22/19 1832 12/22/19 2132   BP: 126/91 122/82 123/70   Pulse: 84 82 96         Visual Acuity      ED Medications  Medications   LORazepam (FOR EMS ONLY) (ATIVAN) 2 mg/mL injection 2 mg (0 mg Does not apply Given to EMS 12/22/19 1633)   ondansetron (FOR EMS ONLY) (ZOFRAN) 4 mg/2 mL injection 4 mg (0 mg Does not apply Given to EMS 12/22/19 1633)   sodium chloride 0 9 % bolus 2,000 mL (0 mL Intravenous Stopped 12/22/19 2135)   ondansetron (ZOFRAN) injection 4 mg (4 mg Intravenous Given 12/22/19 1726)   ketorolac (TORADOL) injection 30 mg (30 mg Intravenous Given 12/22/19 1727)   LORazepam (ATIVAN) 2 mg/mL injection 0 5 mg (0 5 mg Intravenous Given 12/22/19 1728)   potassium chloride (K-DUR,KLOR-CON) CR tablet 40 mEq (40 mEq Oral Given 12/22/19 1907)   iohexol (OMNIPAQUE) 350 MG/ML injection (MULTI-DOSE) 100 mL (100 mL Intravenous Given 12/22/19 1849)   ondansetron (ZOFRAN) injection 4 mg (4 mg Intravenous Given 12/22/19 2018)       Diagnostic Studies  Results Reviewed     Procedure Component Value Units Date/Time    Urine Microscopic [[id number]]  (Abnormal) Collected:  12/22/19 1828    Lab Status:  Final result Specimen:  Urine, Clean Catch Updated:  12/22/19 1843     RBC, UA None Seen /hpf      WBC, UA None Seen /hpf      Epithelial Cells Moderate /hpf      Bacteria, UA None Seen /hpf     UA w Reflex to Microscopic w Reflex to Culture [[id number]]  (Abnormal) Collected:  12/22/19 1828    Lab Status:  Final result Specimen:  Urine, Clean Catch Updated:  12/22/19 1836     Color, UA Yellow     Clarity, UA Slightly Cloudy     Specific Gravity, UA 1 015     pH, UA 8 5     Leukocytes, UA Negative     Nitrite, UA Negative     Protein, UA Trace mg/dl      Glucose, UA Negative mg/dl      Ketones, UA 40 (2+) mg/dl      Urobilinogen, UA 0 2 E U /dl      Bilirubin, UA Negative     Blood, UA Negative    POCT pregnancy, urine [[id number]]  (Normal) Resulted:  12/22/19 1829    Lab Status:  Final result Updated:  12/22/19 1830     EXT PREG TEST UR (Ref: Negative) negative     Control valid    Lipase [[id number]]  (Abnormal) Collected:  12/22/19 1711    Lab Status:  Final result Specimen:  Blood from Arm, Right Updated:  12/22/19 1811     Lipase <10 u/L     Comprehensive metabolic panel [[id number]]  (Abnormal) Collected:  12/22/19 1711    Lab Status:  Final result Specimen:  Blood from Arm, Right Updated:  12/22/19 1811     Sodium 139 mmol/L      Potassium 3 1 mmol/L      Chloride 106 mmol/L      CO2 17 mmol/L      ANION GAP 16 mmol/L      BUN 8 mg/dL      Creatinine 0 59 mg/dL      Glucose 124 mg/dL      Calcium 9 3 mg/dL      AST 23 U/L      ALT 28 U/L      Alkaline Phosphatase 51 U/L      Total Protein 7 7 g/dL      Albumin 4 6 g/dL      Total Bilirubin 0 40 mg/dL      eGFR 123 ml/min/1 73sq m     Narrative:       [place] guidelines for Chronic Kidney Disease (CKD):     Stage 1 with normal or high GFR (GFR > 90 mL/min/1 73 square meters)    Stage 2 Mild CKD (GFR = 60-89 mL/min/1 73 square meters)    Stage 3A Moderate CKD (GFR = 45-59 mL/min/1 73 square meters)    Stage 3B Moderate CKD (GFR = 30-44 mL/min/1 73 square meters)    Stage 4 Severe CKD (GFR = 15-29 mL/min/1 73 square meters)    Stage 5 End Stage CKD (GFR <15 mL/min/1 73 square meters)  Note: GFR calculation is accurate only with a steady state creatinine    CBC and differential [[id number]]  (Abnormal) Collected:  12/22/19 1711    Lab Status:  Final result Specimen:  Blood from Arm, Right Updated:  12/22/19 1756     WBC 16 40 Thousand/uL      RBC 4 27 Million/uL      Hemoglobin 12 2 g/dL      Hematocrit 36 3 %      MCV 85 fL      MCH 28 7 pg      MCHC 33 7 g/dL      RDW 12 9 %      MPV 7 5 fL      Platelets 331 Thousands/uL      Neutrophils Relative 82 %      Lymphocytes Relative 14 %      Monocytes Relative 4 %      Eosinophils Relative 0 %      Basophils Relative 0 %      Neutrophils Absolute 13 40 Thousands/µL      Lymphocytes Absolute 2 30 Thousands/µL      Monocytes Absolute 0 60 Thousand/µL      Eosinophils Absolute 0 00 Thousand/µL      Basophils Absolute 0 00 Thousands/µL                  CT abdomen pelvis with contrast   Final Result by Fidencio Clay MD (12/22 1921)      Ascending colon and transverse mild colitis  Workstation performed: [id number]                    Procedures  Procedures         ED Course  ED Course as of Dec 23 0131   Sun Dec 22, 2019   1949 CT ABDOMEN AND PELVIS WITH IV CONTRAST     INDICATION:   Right upper quadrant abdominal pain        COMPARISON:  None      TECHNIQUE:  CT examination of the abdomen and pelvis was performed  Axial, sagittal, and coronal 2D reformatted images were created from the source data and submitted for interpretation      Radiation dose length product (DLP) for this visit:  239 8 mGy-cm     This examination, like all CT scans performed in the [facility], was performed utilizing techniques to minimize radiation dose exposure, including the use of iterative   reconstruction and automated exposure control      IV Contrast:  100 mL of iohexol (OMNIPAQUE)  Enteric Contrast:  Enteric contrast was not administered      FINDINGS:     ABDOMEN     LOWER CHEST:  No clinically significant abnormality identified in the visualized lower chest      LIVER/BILIARY TREE:  Unremarkable      GALLBLADDER:  No calcified gallstones  Distended gallbladder, if there is right upper quadrant abdominal pain, consider follow-up bilateral quadrant abdominal ultrasound      SPLEEN:  Unremarkable      PANCREAS:  Unremarkable      ADRENAL GLANDS:  Unremarkable      KIDNEYS/URETERS:  Unremarkable  No hydronephrosis      STOMACH AND BOWEL:  Mild wall thickening of the ascending colon and transverse colon could relate to mild colitis      APPENDIX:  No findings to suggest appendicitis      ABDOMINOPELVIC CAVITY:  No ascites or free intraperitoneal air  No lymphadenopathy      VESSELS:  Unremarkable for patient's age      PELVIS     REPRODUCTIVE ORGANS:  Unremarkable for patient's age      URINARY BLADDER:  Nondistended inadequately evaluated      ABDOMINAL WALL/INGUINAL REGIONS:  Unremarkable      OSSEOUS STRUCTURES:  No acute fracture or destructive osseous lesion      IMPRESSION:     Ascending colon and transverse mild colitis             1958 PT SEEN AND EVALUATED    PATIENT UNDERSTANDS THE RESULTS OF HER WORKUP      SHE IS VERY APPRECIATIVE OF HER ER CARE  SHE IS READY TO MANAGE FROM HOME  WILL FINISH IVF AND THEN WILL LIKELY DC AFTER THIS    , BUT WANT      2113 PT READY TO MANAGE FROM HOME  IVF COMPLETED AND SHE IS EASILY TOLERATING FLUIDS                                    MDM      Disposition  Final diagnoses:   Non-intractable vomiting with nausea, unspecified vomiting type   Dehydration   Diarrhea, unspecified type   Generalized abdominal pain     Time reflects when diagnosis was documented in both MDM as applicable and the Disposition within this note     Time User Action Codes Description Comment    12/22/2019  7:57 PM Wright Pillow Add [R11 2] Non-intractable vomiting with nausea, unspecified vomiting type     12/22/2019  7:57 PM Johnathon Mercado [E86 0] Dehydration     12/22/2019  7:57 PM Wright Pillow Add [R19 7] Diarrhea, unspecified type     12/22/2019  9:15 PM Wright Pillow Add [R10 84] Generalized abdominal pain       ED Disposition     ED Disposition Condition Date/Time Comment    Discharge Stable Sun Dec 22, 2019  9:13 PM Muniraruthie Juan discharge to home/self care  Follow-up Information    None         Discharge Medication List as of 12/22/2019  9:16 PM      START taking these medications    Details   azithromycin (ZITHROMAX) 500 MG tablet Take 1 tablet (500 mg total) by mouth daily for 5 days, Starting Sun 12/22/2019, Until Fri 12/27/2019, Normal      ondansetron (ZOFRAN-ODT) 4 mg disintegrating tablet Take 1 tablet (4 mg total) by mouth every 6 (six) hours as needed for nausea or vomiting, Starting Sun 12/22/2019, Normal         CONTINUE these medications which have NOT CHANGED    Details   MULTIPLE VITAMINS PO Take by mouth daily, Starting Fri 5/1/2015, Historical Med           No discharge procedures on file      ED Provider  Electronically Signed by           Tere Millan MD  12/23/19 1518

## 2019-12-23 NOTE — DISCHARGE INSTRUCTIONS
RETURN IF WORSE IN ANY WAY:   SHORTNESS OF BREATH, INCREASED PAIN, FEVER OR FLU LIKE SYMPTOMS, OR NEW AND CONCERNING SYMPTOMS SIGNS OR SYMPTOMS:    PLEASE CALL YOUR PRIMARY DOCTOR IN THE MORNING TO SET UP FOLLOW UP   PLEASE REVIEW THE WORK UP RESULTS WITH YOUR DOCTOR    YOU SHOULD START THE ANTIBIOTICS IF YOUR DIARRHEA CONTINUES

## 2020-01-20 ENCOUNTER — OFFICE VISIT (OUTPATIENT)
Dept: URGENT CARE | Facility: CLINIC | Age: 30
End: 2020-01-20
Payer: COMMERCIAL

## 2020-01-20 VITALS
DIASTOLIC BLOOD PRESSURE: 69 MMHG | BODY MASS INDEX: 21.34 KG/M2 | OXYGEN SATURATION: 99 % | SYSTOLIC BLOOD PRESSURE: 117 MMHG | WEIGHT: 125 LBS | TEMPERATURE: 98.1 F | HEART RATE: 80 BPM | HEIGHT: 64 IN

## 2020-01-20 DIAGNOSIS — J30.9 ALLERGIC RHINITIS, UNSPECIFIED SEASONALITY, UNSPECIFIED TRIGGER: ICD-10-CM

## 2020-01-20 DIAGNOSIS — J06.9 VIRAL UPPER RESPIRATORY TRACT INFECTION: ICD-10-CM

## 2020-01-20 DIAGNOSIS — J32.9 CHRONIC RECURRENT SINUSITIS: Primary | ICD-10-CM

## 2020-01-20 PROCEDURE — G0382 LEV 3 HOSP TYPE B ED VISIT: HCPCS | Performed by: NURSE PRACTITIONER

## 2020-01-20 RX ORDER — AMOXICILLIN AND CLAVULANATE POTASSIUM 875; 125 MG/1; MG/1
1 TABLET, FILM COATED ORAL EVERY 12 HOURS SCHEDULED
Qty: 20 TABLET | Refills: 0 | Status: SHIPPED | OUTPATIENT
Start: 2020-01-20 | End: 2020-01-30

## 2020-01-20 RX ORDER — FLUTICASONE PROPIONATE 50 MCG
2 SPRAY, SUSPENSION (ML) NASAL DAILY
Qty: 1 BOTTLE | Refills: 0 | Status: SHIPPED | OUTPATIENT
Start: 2020-01-20 | End: 2020-02-19

## 2020-01-20 NOTE — PATIENT INSTRUCTIONS
Take the Augmentin as ordered until completed  Eat yogurt or take a probiotic to restore good bacteria to your gut; this helps prevent stomach irritation/diarrhea while on an antibiotic  Over the counter medications may also be used to treat symptoms  Start an over the counter allergy medication (claritin, allegra, zyrtec, etc) and start the flonase  See how that helps your allergy symptoms  If they persist, follow-up with your PCP or an ENT/allergist   A PCP could help with med management, but ENT and/or allergist can help pinpoint allergic triggers, evaluate your sinuses, etc     Sinusitis   AMBULATORY CARE:   Sinusitis  is inflammation or infection of your sinuses  It is most often caused by a virus  Acute sinusitis may last up to 12 weeks  Chronic sinusitis lasts longer than 12 weeks  Recurrent sinusitis means you have 4 or more times in 1 year  Common symptoms include the following:   · Fever    · Pain, pressure, redness, or swelling around the forehead, cheeks, or eyes    · Thick yellow or green discharge from your nose    · Tenderness when you touch your face over your sinuses    · Dry cough that happens mostly at night or when you lie down    · Headache and face pain that is worse when you lean forward    · Tooth pain, or pain when you chew  Seek care immediately if:   · Your eye and eyelid are red, swollen, and painful  · You cannot open your eye  · You have vision changes, such as double vision  · Your eyeball bulges out or you cannot move your eye  · You are more sleepy than normal, or you notice changes in your ability to think, move, or talk  · You have a stiff neck, a fever, or a bad headache  · You have swelling of your forehead or scalp  Contact your healthcare provider if:   · Your symptoms do not improve after 3 days  · Your symptoms do not go away after 10 days  · You have nausea and are vomiting  · Your nose is bleeding      · You have questions or concerns about your condition or care  Treatment for sinusitis:  Your symptoms may go away on their own  Your healthcare provider may recommend watchful waiting for up to 10 days before starting antibiotics  You may  need any of the following:  · Acetaminophen  decreases pain and fever  It is available without a doctor's order  Ask how much to take and how often to take it  Follow directions  Read the labels of all other medicines you are using to see if they also contain acetaminophen, or ask your doctor or pharmacist  Acetaminophen can cause liver damage if not taken correctly  Do not use more than 4 grams (4,000 milligrams) total of acetaminophen in one day  · NSAIDs , such as ibuprofen, help decrease swelling, pain, and fever  This medicine is available with or without a doctor's order  NSAIDs can cause stomach bleeding or kidney problems in certain people  If you take blood thinner medicine, always ask your healthcare provider if NSAIDs are safe for you  Always read the medicine label and follow directions  · Nasal steroid sprays  may help decrease inflammation in your nose and sinuses  · Decongestants  help reduce swelling and drain mucus in the nose and sinuses  They may help you breathe easier  · Antihistamines  help dry mucus in the nose and relieve sneezing  · Antibiotics  help treat or prevent a bacterial infection  · Take your medicine as directed  Contact your healthcare provider if you think your medicine is not helping or if you have side effects  Tell him or her if you are allergic to any medicine  Keep a list of the medicines, vitamins, and herbs you take  Include the amounts, and when and why you take them  Bring the list or the pill bottles to follow-up visits  Carry your medicine list with you in case of an emergency  Self-care:   · Rinse your sinuses  Use a sinus rinse device to rinse your nasal passages with a saline (salt water) solution or distilled water   Do not use tap water  This will help thin the mucus in your nose and rinse away pollen and dirt  It will also help reduce swelling so you can breathe normally  Ask your healthcare provider how often to do this  · Breathe in steam   Heat a bowl of water until you see steam  Lean over the bowl and make a tent over your head with a large towel  Breathe deeply for about 20 minutes  Be careful not to get too close to the steam or burn yourself  Do this 3 times a day  You can also breathe deeply when you take a hot shower  · Sleep with your head elevated  Place an extra pillow under your head before you go to sleep to help your sinuses drain  · Drink liquids as directed  Ask your healthcare provider how much liquid to drink each day and which liquids are best for you  Liquids will thin the mucus in your nose and help it drain  Avoid drinks that contain alcohol or caffeine  · Do not smoke, and avoid secondhand smoke  Nicotine and other chemicals in cigarettes and cigars can make your symptoms worse  Ask your healthcare provider for information if you currently smoke and need help to quit  E-cigarettes or smokeless tobacco still contain nicotine  Talk to your healthcare provider before you use these products  Prevent the spread of germs that cause sinusitis:  Wash your hands often with soap and water  Wash your hands after you use the bathroom, change a child's diaper, or sneeze  Wash your hands before you prepare or eat food  Follow up with your healthcare provider as directed: You may be referred to an ear, nose, and throat specialist  Write down your questions so you remember to ask them during your visits  © 2017 [employer]  Information is for End User's use only and may not be sold, redistributed or otherwise used for commercial purposes  All illustrations and images included in CareNotes® are the copyrighted property of [employer]  or [employer]re[employer]    The above information is an  only  It is not intended as medical advice for individual conditions or treatments  Talk to your doctor, nurse or pharmacist before following any medical regimen to see if it is safe and effective for you  Allergic Rhinitis   AMBULATORY CARE:   Allergic rhinitis , or hay fever, is swelling of the inside of your nose  The swelling is a reaction to allergens in the air  An allergen can be anything that causes an allergic reaction  Allergies to weeds, grass, trees, or mold often cause seasonal allergic rhinitis  Indoor dust mites, cockroaches, pet dander, or mold can also cause allergic rhinitis  Common signs and symptoms include the following:   · Sneezing    · Nasal congestion    · Runny nose    · Itchy nose, eyes, or mouth    · Red, watery eyes    · Postnasal drip (nasal drainage down the back of your throat)    · Cough or frequent throat clearing    · Feeling tired or lethargic    · Dark circles under your eyes  Call 911 for the following:   · You have chest pain or shortness of breath  Seek care immediately if:   · You have severe pain  · You cough up blood  Contact your healthcare provider if:   · You have a fever  · You have ear or sinus pain, or a headache  · Your symptoms get worse, even after treatment  · You have yellow, green, brown, or bloody mucus coming from your nose  · Your nose is bleeding or you have pain inside your nose  · You have trouble sleeping because of your symptoms  · You have questions or concerns about your condition or care  Treatment:   · Antihistamines  help reduce itching, sneezing, and a runny nose  Some antihistamines can make you sleepy  · Nasal steroids  help decrease inflammation in your nose  · Decongestants  help clear your stuffy nose  · Immunotherapy  may be needed if your symptoms are severe or other treatments do not work  Immunotherapy is used to inject an allergen into your skin   At first, the therapy contains tiny amounts of the allergen  Your healthcare provider will slowly increase the amount of allergen  This may help your body be less sensitive to the allergen and stop reacting to it  You may need immunotherapy for weeks or longer  Manage allergic rhinitis:  The best way to manage allergic rhinitis is to avoid allergens that can trigger your symptoms  Any of the following may help decrease your symptoms:  · Rinse your nose and sinuses  with a salt water solution or use a salt water nasal spray  This will help thin the mucus in your nose and rinse away pollen and dirt  It will also help reduce swelling so you can breathe normally  Ask your healthcare provider how often to rinse your nose  · Reduce exposure to dust mites  Wash sheets and towels in hot water every week  Cover your pillows and mattresses with allergen-free covers  Limit the number of stuffed animals and soft toys your child has  Wash your child's toys in hot water regularly  Vacuum weekly and use a vacuum  with an air filter  If possible, get rid of carpets and curtains  These collect dust and dust mites  · Reduce exposure to pollen  Keep windows and doors closed in your house and car  Stay inside when air pollution or the pollen count is high  Run your air conditioner on recycle, and change air filters often  Shower and wash your hair before bed every night to rinse away pollen  · Reduce exposure to pet dander  If possible, do not keep cats, dogs, birds, or other pets  If you do keep pets in your home, keep them out of bedrooms and carpeted rooms  Bathe them often  · Reduce exposure to mold  Do not spend time in basements  Choose artificial plants instead of live plants  Keep your home's humidity at less than 45%  Do not have ponds or standing water in your home or yard  · Do not smoke  Avoid others who smoke   Ask your healthcare provider for information if you currently smoke and need help to quit   Follow up with your healthcare provider as directed:  Write down your questions so you remember to ask them during your visits  © 2017 [employer] Information is for End User's use only and may not be sold, redistributed or otherwise used for commercial purposes  All illustrations and images included in CareNotes® are the copyrighted property of [employer]  or [employer]re[employer]  The above information is an  only  It is not intended as medical advice for individual conditions or treatments  Talk to your doctor, nurse or pharmacist before following any medical regimen to see if it is safe and effective for you

## 2020-01-21 NOTE — PROGRESS NOTES
[address] [facility]        NAME: Lucía Kent is a 30 y o  female  : 1990    MRN: [id number]  DATE: 2020  TIME: 2:30 PM    Assessment and Plan   Chronic recurrent sinusitis [J32 9]  1  Chronic recurrent sinusitis  fluticasone (FLONASE) 50 mcg/act nasal spray    amoxicillin-clavulanate (AUGMENTIN) 875-125 mg per tablet   2  Viral upper respiratory tract infection  fluticasone (FLONASE) 50 mcg/act nasal spray   3  Allergic rhinitis, unspecified seasonality, unspecified trigger  fluticasone (FLONASE) 50 mcg/act nasal spray     We discussed allergy medications at length (otc pills such as claritin, allegra, zyrtec, xyzal): Claritin and allegra better for outdoor and mild allergies; zyrtec stronger for more severe outdoor or mixed indoor/outdoor allergies but slight drowsy profile; xyzal similar to/a derivative of  zyrtec but less drowsy, etc   We discussed otc steroid nasal sprays as well as rx items like antihistamine sprays or singulair pill  We discussed that sometimes allergy testing to identify triggers may be useful if unable to identify through observation, because then avoiding or managing can be used, even if she is not interested in things like allergy shots  We discussed that while there is the possibility that pet dander is a trigger, and that there is no way to completely eliminate that (take 3 months to fully clear without the pet in house), there are more conservative ways to manage this without rehoming pet--removing carpet, keeping out of bedroom and off couch, air purifiers, pet shampoos/wipes, etc but that it is a shame to do all of that if the pet is not the trigger (which is why allergy testing if otc treatments are not relieving symptoms can be helpful)  I suggested starting conservatively with the flonase and an over the counter allergy pill to see how her symptoms responded      I do think she has an acute sinusitis on top of some chronic allergy-triggered sinusitis, so I will treat the acute episode with the antibiotic  Patient Instructions     Patient Instructions     Take the Augmentin as ordered until completed  Eat yogurt or take a probiotic to restore good bacteria to your gut; this helps prevent stomach irritation/diarrhea while on an antibiotic  Over the counter medications may also be used to treat symptoms  Start an over the counter allergy medication (claritin, allegra, zyrtec, etc) and start the flonase  See how that helps your allergy symptoms  If they persist, follow-up with your PCP or an ENT/allergist   A PCP could help with med management, but ENT and/or allergist can help pinpoint allergic triggers, evaluate your sinuses, etc     Sinusitis   AMBULATORY CARE:   Sinusitis  is inflammation or infection of your sinuses  It is most often caused by a virus  Acute sinusitis may last up to 12 weeks  Chronic sinusitis lasts longer than 12 weeks  Recurrent sinusitis means you have 4 or more times in 1 year  Common symptoms include the following:   · Fever    · Pain, pressure, redness, or swelling around the forehead, cheeks, or eyes    · Thick yellow or green discharge from your nose    · Tenderness when you touch your face over your sinuses    · Dry cough that happens mostly at night or when you lie down    · Headache and face pain that is worse when you lean forward    · Tooth pain, or pain when you chew  Seek care immediately if:   · Your eye and eyelid are red, swollen, and painful  · You cannot open your eye  · You have vision changes, such as double vision  · Your eyeball bulges out or you cannot move your eye  · You are more sleepy than normal, or you notice changes in your ability to think, move, or talk  · You have a stiff neck, a fever, or a bad headache  · You have swelling of your forehead or scalp  Contact your healthcare provider if:   · Your symptoms do not improve after 3 days      · Your symptoms do not go away after 10 days  · You have nausea and are vomiting  · Your nose is bleeding  · You have questions or concerns about your condition or care  Treatment for sinusitis:  Your symptoms may go away on their own  Your healthcare provider may recommend watchful waiting for up to 10 days before starting antibiotics  You may  need any of the following:  · Acetaminophen  decreases pain and fever  It is available without a doctor's order  Ask how much to take and how often to take it  Follow directions  Read the labels of all other medicines you are using to see if they also contain acetaminophen, or ask your doctor or pharmacist  Acetaminophen can cause liver damage if not taken correctly  Do not use more than 4 grams (4,000 milligrams) total of acetaminophen in one day  · NSAIDs , such as ibuprofen, help decrease swelling, pain, and fever  This medicine is available with or without a doctor's order  NSAIDs can cause stomach bleeding or kidney problems in certain people  If you take blood thinner medicine, always ask your healthcare provider if NSAIDs are safe for you  Always read the medicine label and follow directions  · Nasal steroid sprays  may help decrease inflammation in your nose and sinuses  · Decongestants  help reduce swelling and drain mucus in the nose and sinuses  They may help you breathe easier  · Antihistamines  help dry mucus in the nose and relieve sneezing  · Antibiotics  help treat or prevent a bacterial infection  · Take your medicine as directed  Contact your healthcare provider if you think your medicine is not helping or if you have side effects  Tell him or her if you are allergic to any medicine  Keep a list of the medicines, vitamins, and herbs you take  Include the amounts, and when and why you take them  Bring the list or the pill bottles to follow-up visits  Carry your medicine list with you in case of an emergency  Self-care:   · Rinse your sinuses    Use a sinus rinse device to rinse your nasal passages with a saline (salt water) solution or distilled water  Do not use tap water  This will help thin the mucus in your nose and rinse away pollen and dirt  It will also help reduce swelling so you can breathe normally  Ask your healthcare provider how often to do this  · Breathe in steam   Heat a bowl of water until you see steam  Lean over the bowl and make a tent over your head with a large towel  Breathe deeply for about 20 minutes  Be careful not to get too close to the steam or burn yourself  Do this 3 times a day  You can also breathe deeply when you take a hot shower  · Sleep with your head elevated  Place an extra pillow under your head before you go to sleep to help your sinuses drain  · Drink liquids as directed  Ask your healthcare provider how much liquid to drink each day and which liquids are best for you  Liquids will thin the mucus in your nose and help it drain  Avoid drinks that contain alcohol or caffeine  · Do not smoke, and avoid secondhand smoke  Nicotine and other chemicals in cigarettes and cigars can make your symptoms worse  Ask your healthcare provider for information if you currently smoke and need help to quit  E-cigarettes or smokeless tobacco still contain nicotine  Talk to your healthcare provider before you use these products  Prevent the spread of germs that cause sinusitis:  Wash your hands often with soap and water  Wash your hands after you use the bathroom, change a child's diaper, or sneeze  Wash your hands before you prepare or eat food  Follow up with your healthcare provider as directed: You may be referred to an ear, nose, and throat specialist  Write down your questions so you remember to ask them during your visits  © 2017 [employer] Information is for End User's use only and may not be sold, redistributed or otherwise used for commercial purposes   All illustrations and images included in CareNotes® are the copyrighted property of [employer]  or [employer]  The above information is an  only  It is not intended as medical advice for individual conditions or treatments  Talk to your doctor, nurse or pharmacist before following any medical regimen to see if it is safe and effective for you  Allergic Rhinitis   AMBULATORY CARE:   Allergic rhinitis , or hay fever, is swelling of the inside of your nose  The swelling is a reaction to allergens in the air  An allergen can be anything that causes an allergic reaction  Allergies to weeds, grass, trees, or mold often cause seasonal allergic rhinitis  Indoor dust mites, cockroaches, pet dander, or mold can also cause allergic rhinitis  Common signs and symptoms include the following:   · Sneezing    · Nasal congestion    · Runny nose    · Itchy nose, eyes, or mouth    · Red, watery eyes    · Postnasal drip (nasal drainage down the back of your throat)    · Cough or frequent throat clearing    · Feeling tired or lethargic    · Dark circles under your eyes  Call 911 for the following:   · You have chest pain or shortness of breath  Seek care immediately if:   · You have severe pain  · You cough up blood  Contact your healthcare provider if:   · You have a fever  · You have ear or sinus pain, or a headache  · Your symptoms get worse, even after treatment  · You have yellow, green, brown, or bloody mucus coming from your nose  · Your nose is bleeding or you have pain inside your nose  · You have trouble sleeping because of your symptoms  · You have questions or concerns about your condition or care  Treatment:   · Antihistamines  help reduce itching, sneezing, and a runny nose  Some antihistamines can make you sleepy  · Nasal steroids  help decrease inflammation in your nose  · Decongestants  help clear your stuffy nose      · Immunotherapy  may be needed if your symptoms are severe or other treatments do not work  Immunotherapy is used to inject an allergen into your skin  At first, the therapy contains tiny amounts of the allergen  Your healthcare provider will slowly increase the amount of allergen  This may help your body be less sensitive to the allergen and stop reacting to it  You may need immunotherapy for weeks or longer  Manage allergic rhinitis:  The best way to manage allergic rhinitis is to avoid allergens that can trigger your symptoms  Any of the following may help decrease your symptoms:  · Rinse your nose and sinuses  with a salt water solution or use a salt water nasal spray  This will help thin the mucus in your nose and rinse away pollen and dirt  It will also help reduce swelling so you can breathe normally  Ask your healthcare provider how often to rinse your nose  · Reduce exposure to dust mites  Wash sheets and towels in hot water every week  Cover your pillows and mattresses with allergen-free covers  Limit the number of stuffed animals and soft toys your child has  Wash your child's toys in hot water regularly  Vacuum weekly and use a vacuum  with an air filter  If possible, get rid of carpets and curtains  These collect dust and dust mites  · Reduce exposure to pollen  Keep windows and doors closed in your house and car  Stay inside when air pollution or the pollen count is high  Run your air conditioner on recycle, and change air filters often  Shower and wash your hair before bed every night to rinse away pollen  · Reduce exposure to pet dander  If possible, do not keep cats, dogs, birds, or other pets  If you do keep pets in your home, keep them out of bedrooms and carpeted rooms  Bathe them often  · Reduce exposure to mold  Do not spend time in basements  Choose artificial plants instead of live plants  Keep your home's humidity at less than 45%  Do not have ponds or standing water in your home or yard  · Do not smoke    Avoid others who smoke  Ask your healthcare provider for information if you currently smoke and need help to quit  Follow up with your healthcare provider as directed:  Write down your questions so you remember to ask them during your visits  © 2017 [employer] Information is for End User's use only and may not be sold, redistributed or otherwise used for commercial purposes  All illustrations and images included in CareNotes® are the copyrighted property of [employer]  or [employer]  The above information is an  only  It is not intended as medical advice for individual conditions or treatments  Talk to your doctor, nurse or pharmacist before following any medical regimen to see if it is safe and effective for you  Follow up with PCP in 3-5 days  Proceed to  ER if symptoms worsen  Chief Complaint     Chief Complaint   Patient presents with    Nasal Congestion     2 DAYS    Sore Throat         History of Present Illness       Patient presents, noting increased sinus pressure over the last few days but complaining of chronic sinus pressure/congestion  She has a history of sinusitis, but states that she also thinks she has allergies--she knows she reacts to dust, but thinks she may have other triggers  She is not currently on any allergy medication  Review of Systems   Review of Systems   HENT: Positive for congestion, postnasal drip, rhinorrhea, sinus pressure, sinus pain and sneezing  Allergic/Immunologic: Positive for environmental allergies  All other systems reviewed and are negative          Current Medications       Current Outpatient Medications:     amoxicillin-clavulanate (AUGMENTIN) 875-125 mg per tablet, Take 1 tablet by mouth every 12 (twelve) hours for 10 days, Disp: 20 tablet, Rfl: 0    fluticasone (FLONASE) 50 mcg/act nasal spray, 2 sprays into each nostril daily, Disp: 1 Bottle, Rfl: 0    Current Allergies     Allergies as of 01/20/2020 - Reviewed 01/20/2020   Allergen Reaction Noted    Other  06/18/2014    Shellfish allergy  06/18/2014            The following portions of the patient's history were reviewed and updated as appropriate: allergies, current medications, past family history, past medical history, past social history, past surgical history and problem list      Past Medical History:   Diagnosis Date    Allergic     Dust allergy     Gestational diabetes     Shellfish allergy        History reviewed  No pertinent surgical history  Family History   Problem Relation Age of Onset    No Known Problems Mother     No Known Problems Father          Medications have been verified  Objective   /69   Pulse 80   Temp 98 1 °F (36 7 °C)   Ht 5' 4" (1 626 m)   Wt 56 7 kg (125 lb)   SpO2 99%   BMI 21 46 kg/m²        Physical Exam     Physical Exam   Constitutional: She is oriented to person, place, and time  She appears well-developed and well-nourished  She is cooperative  Non-toxic appearance  She appears ill  No distress  HENT:   Head: Normocephalic and atraumatic  Right Ear: Hearing, tympanic membrane, external ear and ear canal normal    Left Ear: Hearing, tympanic membrane, external ear and ear canal normal    Nose: Mucosal edema and sinus tenderness present  Right sinus exhibits maxillary sinus tenderness and frontal sinus tenderness  Left sinus exhibits maxillary sinus tenderness and frontal sinus tenderness  Mouth/Throat: Uvula is midline, oropharynx is clear and moist and mucous membranes are normal  No oropharyngeal exudate, posterior oropharyngeal edema, posterior oropharyngeal erythema or tonsillar abscesses  Tonsils are 1+ on the right  Tonsils are 1+ on the left  No tonsillar exudate  Eyes: Pupils are equal, round, and reactive to light  Neck: Normal range of motion  Neck supple  Cardiovascular: Normal rate, regular rhythm and normal heart sounds     Pulmonary/Chest: Effort normal and breath sounds normal  No accessory muscle usage or stridor  No apnea, no tachypnea and no bradypnea  No respiratory distress  She has no decreased breath sounds  She has no wheezes  She has no rhonchi  She has no rales  Abdominal: Soft  Bowel sounds are normal  She exhibits no distension  There is no tenderness  Musculoskeletal: Normal range of motion  Lymphadenopathy:     She has cervical adenopathy  Neurological: She is alert and oriented to person, place, and time  Skin: Skin is warm and dry  Capillary refill takes less than 2 seconds  She is not diaphoretic  Psychiatric: She has a normal mood and affect  Her behavior is normal  Judgment and thought content normal    Nursing note and vitals reviewed

## 2021-09-06 ENCOUNTER — OFFICE VISIT (OUTPATIENT)
Dept: URGENT CARE | Facility: CLINIC | Age: 31
End: 2021-09-06
Payer: COMMERCIAL

## 2021-09-06 VITALS — HEART RATE: 103 BPM | RESPIRATION RATE: 18 BRPM | OXYGEN SATURATION: 98 % | TEMPERATURE: 98.2 F

## 2021-09-06 DIAGNOSIS — R19.7 DIARRHEA, UNSPECIFIED TYPE: Primary | ICD-10-CM

## 2021-09-06 DIAGNOSIS — R50.81 FEVER IN OTHER DISEASES: ICD-10-CM

## 2021-09-06 PROCEDURE — 0241U HB NFCT DS VIR RESP RNA 4 TRGT: CPT | Performed by: PHYSICIAN ASSISTANT

## 2021-09-06 PROCEDURE — 99213 OFFICE O/P EST LOW 20 MIN: CPT | Performed by: PHYSICIAN ASSISTANT

## 2021-09-06 NOTE — PROGRESS NOTES
[address]        NAME: Ana Ramos is a 26 y o  female  : 1990    MRN: [id number]  DATE: 2021  TIME: 8:14 AM    Assessment and Plan   Diarrhea, unspecified type [R19 7]  1  Diarrhea, unspecified type     2  Fever in other diseases  COVID19, Influenza A/B, RSV PCR, [facility]- Office Collection    COVID19, Influenza A/B, RSV PCR, [facility]- Office Collection         Patient Instructions   Patient Instructions   Nutrition Tips for Relief of Diarrhea   WHAT YOU NEED TO KNOW:   There are diet changes you can make to help relieve or stop diarrhea  These changes include limiting or avoiding foods and liquids that are high in sugar, fat, fiber, and lactose  Lactose is a sugar found in milk products  Milk products can cause diarrhea in people who are lactose intolerant  You should also drink extra liquids to replace fluids that are lost when you have diarrhea  Diarrhea can lead to dehydration  DISCHARGE INSTRUCTIONS:   Foods to limit or avoid:   · Dairy:      ? Whole milk    ? Half-and-half, cream, and sour cream    ? Regular (whole milk) ice cream    · Grains:      ? Whole wheat and whole grain breads, pasta, cereals, and crackers    ? Elvia Mackenzie and wild rice    ? Breads and cereals with seeds or nuts    ? Popcorn    · Fruit and vegetables:      ? All raw fruits, except bananas and melon    ? Dried fruits, including prunes and raisins    ? Canned fruit in heavy syrup    ? Prune juice and any fruit juice with pulp    ? Raw vegetables, except lettuce     ? Fried vegetables    ? Corn, raw and cooked broccoli, cabbage, cauliflower, and carolina greens    · Protein:      ? Fried meat, poultry, and fish    ? High-fat luncheon meats, such as bologna    ? Fatty meats, such as sausage, strickland, and hot dogs    ? Beans and nuts    · Liquids:      ? Sodas and fruit-flavored drinks    ? Drinks that contain caffeine, such as energy drinks, coffee, and tea     ?  Drinks that contain alcohol or sugar alcohol, such as sorbitol    Foods and liquids you may eat or drink:  Most people can tolerate the foods and liquids listed below  If any of them make your symptoms worse, stop eating or drinking them until you feel better  If you are lactose intolerant, avoid milk products  · Dairy:      ? Skim or low-fat milk or evaporated milk    ? Soy milk or buttermilk     ? Low-fat, part-skim, and aged cheese    ? Yogurt, low-fat ice cream, or sherbert    · Grains:  (Choose foods with less than 2 grams of dietary fiber per serving )     ? White or refined flour breads, bagels, pasta, and crackers    ? Cold or hot cereals made from white or refined flour such as puffed rice, cornflakes, or cream of wheat    ? White rice    · Fruit and vegetables:      ? Bananas or melon    ? Fruit juice without pulp, except prune juice    ? Canned fruit in juice or light syrup    ? Lettuce and most well-cooked vegetables without seeds or skins     ? Strained vegetable juice    · Protein:      ? Tender, well-cooked meat, poultry, or fish    ? Well-cooked eggs or soy foods (cooked without added fat)    ? Smooth nut butters    · Fats:  (Limit fats to less than 8 teaspoons a day)     ? Oil, butter, or margarine, or mayonnaise    ? Cream cheese or salad dressings    · Liquids:      ? For infants, breast milk or formula    ? Oral rehydration solution     ? Decaffeinated coffee or caffeine-free teas    ? Soft drinks without caffeine    Other guidelines to follow:   · Drink liquids as directed  You may need to drink more liquids than usual to prevent dehydration  Ask how much liquid to drink each day and which liquids are best for you  You may need to drink an oral rehydration solution (ORS)  An ORS helps replace fluids and electrolytes that you lose when you have diarrhea  · Eat small meals or snacks every 3 to 4 hours  instead of large meals  Continue eating even if you still have diarrhea   Your diarrhea will continue for a few days but should gradually go away     © [facility] 2021 Information is for End User's use only and may not be sold, redistributed or otherwise used for commercial purposes  All illustrations and images included in CareNotes® are the copyrighted property of A D A M , Inc  or Maximo Will  The above information is an  only  It is not intended as medical advice for individual conditions or treatments  Talk to your doctor, nurse or pharmacist before following any medical regimen to see if it is safe and effective for you  Follow up with PCP in 3-5 days  Proceed to  ER if symptoms worsen  Chief Complaint     Chief Complaint   Patient presents with    Diarrhea     Diarrhea since this morning and congestion for 4 days  History of Present Illness       The patient complains of diarrhea and nasal congestion over the past 4 days  She states she did have the [employer] vaccine back in [place]  She also states that she was at a conference last week for work where there were over 100 attendees  She denies associated fever, chills, cough, shortness of breath, loss of sense of taste, loss of sense of smell  She does have upset stomach and nausea and states that she is unable to eat very much  Her daughter is also in the office with similar symptoms  She denies any recent uncooked foods or new foods  Review of 4 days  She states that she did have the [address] [facility]   Review of Systems   Constitutional: Negative for chills and fever  HENT: Positive for congestion  Negative for ear pain and sore throat  Eyes: Negative for pain and visual disturbance  Respiratory: Negative for cough and shortness of breath  Cardiovascular: Negative for chest pain and palpitations  Gastrointestinal: Positive for diarrhea  Negative for abdominal pain and vomiting  Genitourinary: Negative for dysuria and hematuria  Musculoskeletal: Negative for arthralgias and back pain     Skin: Negative for color change and rash  Neurological: Negative for seizures and syncope  All other systems reviewed and are negative  Current Medications       Current Outpatient Medications:     fluticasone (FLONASE) 50 mcg/act nasal spray, 2 sprays into each nostril daily, Disp: 1 Bottle, Rfl: 0    Current Allergies     Allergies as of 09/06/2021 - Reviewed 09/06/2021   Allergen Reaction Noted    Other  06/18/2014    Shellfish allergy - food allergy  06/18/2014            The following portions of the patient's history were reviewed and updated as appropriate: allergies, current medications, past family history, past medical history, past social history, past surgical history and problem list      Past Medical History:   Diagnosis Date    Allergic     Dust allergy     Gestational diabetes     Shellfish allergy        History reviewed  No pertinent surgical history  Family History   Problem Relation Age of Onset    No Known Problems Mother     No Known Problems Father          Medications have been verified  Objective   Pulse 103   Temp 98 2 °F (36 8 °C)   Resp 18   SpO2 98%        Physical Exam     Physical Exam  Constitutional:       Appearance: She is well-developed  She is not diaphoretic  HENT:      Head: Normocephalic  Eyes:      General:         Right eye: No discharge  Left eye: No discharge  Pupils: Pupils are equal, round, and reactive to light  Neck:      Thyroid: No thyromegaly  Cardiovascular:      Rate and Rhythm: Normal rate  Heart sounds: No murmur heard  Pulmonary:      Effort: Pulmonary effort is normal  No respiratory distress  Breath sounds: No wheezing or rales  Chest:      Chest wall: No tenderness  Abdominal:      General: There is no distension  Palpations: Abdomen is soft  Tenderness: There is abdominal tenderness  There is no guarding or rebound        Comments: Patient has mild tenderness noted in the lower abdomen area  There was no guarding or rebound noted   Musculoskeletal:         General: Normal range of motion  Cervical back: Normal range of motion  Lymphadenopathy:      Cervical: No cervical adenopathy  Skin:     General: Skin is warm  Neurological:      Mental Status: She is alert and oriented to person, place, and time          -the patient's symptoms are likely viral   I suggest supportive treatment and will send out a COVID swab  The patient was instructed to follow a brat diet  She will need to have stool cultures by her primary care doctor if her symptoms fail to improve in 1 week  She was instructed to go the ER if symptoms worsen

## 2021-09-06 NOTE — PATIENT INSTRUCTIONS
Nutrition Tips for Relief of Diarrhea   WHAT YOU NEED TO KNOW:   There are diet changes you can make to help relieve or stop diarrhea  These changes include limiting or avoiding foods and liquids that are high in sugar, fat, fiber, and lactose  Lactose is a sugar found in milk products  Milk products can cause diarrhea in people who are lactose intolerant  You should also drink extra liquids to replace fluids that are lost when you have diarrhea  Diarrhea can lead to dehydration  DISCHARGE INSTRUCTIONS:   Foods to limit or avoid:   · Dairy:      ? Whole milk    ? Half-and-half, cream, and sour cream    ? Regular (whole milk) ice cream    · Grains:      ? Whole wheat and whole grain breads, pasta, cereals, and crackers    ? Shirlean Steele and wild rice    ? Breads and cereals with seeds or nuts    ? Popcorn    · Fruit and vegetables:      ? All raw fruits, except bananas and melon    ? Dried fruits, including prunes and raisins    ? Canned fruit in heavy syrup    ? Prune juice and any fruit juice with pulp    ? Raw vegetables, except lettuce     ? Fried vegetables    ? Corn, raw and cooked broccoli, cabbage, cauliflower, and carolina greens    · Protein:      ? Fried meat, poultry, and fish    ? High-fat luncheon meats, such as bologna    ? Fatty meats, such as sausage, strickland, and hot dogs    ? Beans and nuts    · Liquids:      ? Sodas and fruit-flavored drinks    ? Drinks that contain caffeine, such as energy drinks, coffee, and tea     ? Drinks that contain alcohol or sugar alcohol, such as sorbitol    Foods and liquids you may eat or drink:  Most people can tolerate the foods and liquids listed below  If any of them make your symptoms worse, stop eating or drinking them until you feel better  If you are lactose intolerant, avoid milk products  · Dairy:      ? Skim or low-fat milk or evaporated milk    ? Soy milk or buttermilk     ? Low-fat, part-skim, and aged cheese    ?  Yogurt, low-fat ice cream, or sherbert    · Grains:  (Choose foods with less than 2 grams of dietary fiber per serving )     ? White or refined flour breads, bagels, pasta, and crackers    ? Cold or hot cereals made from white or refined flour such as puffed rice, cornflakes, or cream of wheat    ? White rice    · Fruit and vegetables:      ? Bananas or melon    ? Fruit juice without pulp, except prune juice    ? Canned fruit in juice or light syrup    ? Lettuce and most well-cooked vegetables without seeds or skins     ? Strained vegetable juice    · Protein:      ? Tender, well-cooked meat, poultry, or fish    ? Well-cooked eggs or soy foods (cooked without added fat)    ? Smooth nut butters    · Fats:  (Limit fats to less than 8 teaspoons a day)     ? Oil, butter, or margarine, or mayonnaise    ? Cream cheese or salad dressings    · Liquids:      ? For infants, breast milk or formula    ? Oral rehydration solution     ? Decaffeinated coffee or caffeine-free teas    ? Soft drinks without caffeine    Other guidelines to follow:   · Drink liquids as directed  You may need to drink more liquids than usual to prevent dehydration  Ask how much liquid to drink each day and which liquids are best for you  You may need to drink an oral rehydration solution (ORS)  An ORS helps replace fluids and electrolytes that you lose when you have diarrhea  · Eat small meals or snacks every 3 to 4 hours  instead of large meals  Continue eating even if you still have diarrhea  Your diarrhea will continue for a few days but should gradually go away  © Copyright [employer] 2021 Information is for End User's use only and may not be sold, redistributed or otherwise used for commercial purposes  All illustrations and images included in CareNotes® are the copyrighted property of [employer]  or [facility] Delano Nava   The above information is an  only  It is not intended as medical advice for individual conditions or treatments   Talk to your doctor, nurse or pharmacist before following any medical regimen to see if it is safe and effective for you

## 2021-09-07 LAB
FLUAV RNA RESP QL NAA+PROBE: NEGATIVE
FLUBV RNA RESP QL NAA+PROBE: NEGATIVE
RSV RNA RESP QL NAA+PROBE: NEGATIVE
SARS-COV-2 RNA RESP QL NAA+PROBE: NEGATIVE

## 2022-04-17 ENCOUNTER — OFFICE VISIT (OUTPATIENT)
Dept: URGENT CARE | Facility: CLINIC | Age: 32
End: 2022-04-17
Payer: COMMERCIAL

## 2022-04-17 ENCOUNTER — HOSPITAL ENCOUNTER (EMERGENCY)
Facility: HOSPITAL | Age: 32
Discharge: HOME | End: 2022-04-17
Attending: EMERGENCY MEDICINE
Payer: COMMERCIAL

## 2022-04-17 VITALS
HEART RATE: 90 BPM | OXYGEN SATURATION: 100 % | RESPIRATION RATE: 18 BRPM | TEMPERATURE: 97.9 F | SYSTOLIC BLOOD PRESSURE: 107 MMHG | WEIGHT: 133 LBS | DIASTOLIC BLOOD PRESSURE: 65 MMHG | HEIGHT: 64 IN | BODY MASS INDEX: 22.71 KG/M2

## 2022-04-17 VITALS — OXYGEN SATURATION: 100 % | RESPIRATION RATE: 18 BRPM | HEART RATE: 97 BPM | TEMPERATURE: 97.6 F

## 2022-04-17 DIAGNOSIS — Z34.91 FIRST TRIMESTER PREGNANCY: ICD-10-CM

## 2022-04-17 DIAGNOSIS — J02.9 SORE THROAT: ICD-10-CM

## 2022-04-17 DIAGNOSIS — J06.9 VIRAL URI: ICD-10-CM

## 2022-04-17 DIAGNOSIS — R10.84 GENERALIZED ABDOMINAL PAIN: ICD-10-CM

## 2022-04-17 DIAGNOSIS — R19.7 NAUSEA VOMITING AND DIARRHEA: Primary | ICD-10-CM

## 2022-04-17 DIAGNOSIS — O21.9 NAUSEA AND VOMITING DURING PREGNANCY: ICD-10-CM

## 2022-04-17 DIAGNOSIS — R11.2 NAUSEA VOMITING AND DIARRHEA: Primary | ICD-10-CM

## 2022-04-17 DIAGNOSIS — R11.2 NAUSEA AND VOMITING: Primary | ICD-10-CM

## 2022-04-17 LAB
ALBUMIN SERPL BCP-MCNC: 4.4 G/DL (ref 3.5–5)
ALP SERPL-CCNC: 61 U/L (ref 34–104)
ALT SERPL W P-5'-P-CCNC: 62 U/L (ref 7–52)
ANION GAP SERPL CALCULATED.3IONS-SCNC: 10 MMOL/L (ref 4–13)
AST SERPL W P-5'-P-CCNC: 41 U/L (ref 13–39)
BILIRUB SERPL-MCNC: 0.75 MG/DL (ref 0.2–1)
BILIRUB UR QL STRIP: NEGATIVE
BUN SERPL-MCNC: 5 MG/DL (ref 5–25)
CALCIUM SERPL-MCNC: 9.6 MG/DL (ref 8.4–10.2)
CHLORIDE SERPL-SCNC: 100 MMOL/L (ref 96–108)
CLARITY UR: CLEAR
CO2 SERPL-SCNC: 24 MMOL/L (ref 21–32)
COLOR UR: YELLOW
CREAT SERPL-MCNC: 0.49 MG/DL (ref 0.6–1.3)
FLUAV RNA RESP QL NAA+PROBE: NEGATIVE
FLUBV RNA RESP QL NAA+PROBE: NEGATIVE
GFR SERPL CREATININE-BSD FRML MDRD: 128 ML/MIN/1.73SQ M
GLUCOSE SERPL-MCNC: 81 MG/DL (ref 65–140)
GLUCOSE UR STRIP-MCNC: NEGATIVE MG/DL
HGB UR QL STRIP.AUTO: NEGATIVE
KETONES UR STRIP-MCNC: ABNORMAL MG/DL
LEUKOCYTE ESTERASE UR QL STRIP: NEGATIVE
MAGNESIUM SERPL-MCNC: 1.9 MG/DL (ref 1.9–2.7)
NITRITE UR QL STRIP: NEGATIVE
PH UR STRIP.AUTO: 6 [PH]
POTASSIUM SERPL-SCNC: 3.5 MMOL/L (ref 3.5–5.3)
PROT SERPL-MCNC: 7.5 G/DL (ref 6.4–8.4)
PROT UR STRIP-MCNC: NEGATIVE MG/DL
RSV RNA RESP QL NAA+PROBE: NEGATIVE
S PYO AG THROAT QL: NEGATIVE
SARS-COV-2 RNA RESP QL NAA+PROBE: NEGATIVE
SODIUM SERPL-SCNC: 134 MMOL/L (ref 135–147)
SP GR UR STRIP.AUTO: 1.01 (ref 1–1.03)
UROBILINOGEN UR QL STRIP.AUTO: 0.2 E.U./DL

## 2022-04-17 PROCEDURE — 87086 URINE CULTURE/COLONY COUNT: CPT | Performed by: EMERGENCY MEDICINE

## 2022-04-17 PROCEDURE — 0241U HB NFCT DS VIR RESP RNA 4 TRGT: CPT | Performed by: EMERGENCY MEDICINE

## 2022-04-17 PROCEDURE — 83735 ASSAY OF MAGNESIUM: CPT | Performed by: EMERGENCY MEDICINE

## 2022-04-17 PROCEDURE — 87070 CULTURE OTHR SPECIMN AEROBIC: CPT

## 2022-04-17 PROCEDURE — 96361 HYDRATE IV INFUSION ADD-ON: CPT

## 2022-04-17 PROCEDURE — 87147 CULTURE TYPE IMMUNOLOGIC: CPT

## 2022-04-17 PROCEDURE — 99283 EMERGENCY DEPT VISIT LOW MDM: CPT

## 2022-04-17 PROCEDURE — 99284 EMERGENCY DEPT VISIT MOD MDM: CPT | Performed by: EMERGENCY MEDICINE

## 2022-04-17 PROCEDURE — 99213 OFFICE O/P EST LOW 20 MIN: CPT

## 2022-04-17 PROCEDURE — 96374 THER/PROPH/DIAG INJ IV PUSH: CPT

## 2022-04-17 PROCEDURE — 36415 COLL VENOUS BLD VENIPUNCTURE: CPT | Performed by: EMERGENCY MEDICINE

## 2022-04-17 PROCEDURE — 80053 COMPREHEN METABOLIC PANEL: CPT | Performed by: EMERGENCY MEDICINE

## 2022-04-17 PROCEDURE — 87636 SARSCOV2 & INF A&B AMP PRB: CPT

## 2022-04-17 PROCEDURE — 81003 URINALYSIS AUTO W/O SCOPE: CPT | Performed by: EMERGENCY MEDICINE

## 2022-04-17 RX ORDER — PYRIDOXINE HCL (VITAMIN B6) 25 MG
12.5 TABLET ORAL DAILY PRN
Qty: 30 TABLET | Refills: 0 | Status: SHIPPED | OUTPATIENT
Start: 2022-04-17 | End: 2022-07-12 | Stop reason: CLARIF

## 2022-04-17 RX ORDER — ACETAMINOPHEN 325 MG/1
650 TABLET ORAL ONCE
Status: COMPLETED | OUTPATIENT
Start: 2022-04-17 | End: 2022-04-17

## 2022-04-17 RX ORDER — ONDANSETRON 2 MG/ML
4 INJECTION INTRAMUSCULAR; INTRAVENOUS ONCE
Status: COMPLETED | OUTPATIENT
Start: 2022-04-17 | End: 2022-04-17

## 2022-04-17 RX ORDER — ONDANSETRON 4 MG/1
4 TABLET, ORALLY DISINTEGRATING ORAL EVERY 6 HOURS PRN
Qty: 20 TABLET | Refills: 0 | Status: SHIPPED | OUTPATIENT
Start: 2022-04-17 | End: 2022-07-12 | Stop reason: CLARIF

## 2022-04-17 RX ADMIN — ACETAMINOPHEN 650 MG: 325 TABLET ORAL at 11:12

## 2022-04-17 RX ADMIN — SODIUM CHLORIDE 1000 ML: 0.9 INJECTION, SOLUTION INTRAVENOUS at 11:02

## 2022-04-17 RX ADMIN — ONDANSETRON 4 MG: 2 INJECTION INTRAMUSCULAR; INTRAVENOUS at 11:00

## 2022-04-17 NOTE — Clinical Note
Magalis Martinez was seen and treated in our emergency department on 4/17/2022  No restrictions            Diagnosis:     J Carlos Kimball  may return to school on return date  She may return on this date: 04/19/2022         If you have any questions or concerns, please don't hesitate to call        Leonie Jolley, DO    ______________________________           _______________          _______________  Hospital Representative                              Date                                Time

## 2022-04-17 NOTE — PROGRESS NOTES
[address]        NAME: Judi Rueda is a 33 y o  female  : 1990    MRN: [id number]  DATE: 2022  TIME: 10:06 AM      Assessment and Plan     Nausea vomiting and diarrhea [R11 2, R19 7]  1  Nausea vomiting and diarrhea  Covid19 and INFLUENZA A/B PCR    Transfer to other facility   2  Sore throat  POCT rapid strepA    Throat culture    Transfer to other facility   3  Generalized abdominal pain  Transfer to other facility   4  First trimester pregnancy  Transfer to other facility     poct strep negative  Throat culture sent   Pt requesting to be tested for influenza  Patient agreeable to proceed to the ER for further evaluation given n/v/d/abd pain, concern for dehydration and 8 weeks pregnant  Patient Instructions     Proceed to the ER for further evaluation  [address], [place], [address] [facility]    Chief Complaint     Chief Complaint   Patient presents with    Sore Throat     Pt c/o a sore throat, nausea, and diarrhea since yesterday  History of Present Illness     Patient is a 27-year-old female who presents with nausea, vomiting, diarrhea, abdominal pain, runny nose, congestion, and sore throat for one day  States that she is unable to keep any fluid down  Reports she keeps throwing up bile  Reports generalized abdominal pain  Reports chills, no fever  Patient is approximately 8 weeks pregnant  Denies vaginal bleeding  Denies  pelvic pain  States both of her children have a runny nose  Review of Systems     Review of Systems   Constitutional: Positive for chills  Negative for fever  HENT: Positive for congestion, rhinorrhea and sore throat  Respiratory: Negative for cough  Gastrointestinal: Positive for abdominal pain (lower abdomen), diarrhea and vomiting  Genitourinary: Negative for pelvic pain and vaginal bleeding  All other systems reviewed and are negative          Current Medications       Current Outpatient Medications:     fluticasone (FLONASE) 50 mcg/act nasal spray, 2 sprays into each nostril daily, Disp: 1 Bottle, Rfl: 0    Current Allergies     Allergies as of 04/17/2022 - Reviewed 04/17/2022   Allergen Reaction Noted    Other  06/18/2014    Shellfish allergy - food allergy  06/18/2014              The following portions of the patient's history were reviewed and updated as appropriate: allergies, current medications, past family history, past medical history, past social history, past surgical history and problem list      Past Medical History:   Diagnosis Date    Allergic     Dust allergy     Gestational diabetes     Shellfish allergy        History reviewed  No pertinent surgical history  Family History   Problem Relation Age of Onset    No Known Problems Mother     No Known Problems Father          Medications have been verified  Objective     Pulse 97   Temp 97 6 °F (36 4 °C)   Resp 18   SpO2 100%   No LMP recorded  Patient is pregnant  Physical Exam     Physical Exam  Vitals and nursing note reviewed  Constitutional:       General: She is awake  She is not in acute distress  Appearance: She is ill-appearing (mild)  She is not toxic-appearing or diaphoretic  HENT:      Right Ear: Ear canal and external ear normal  There is no impacted cerumen  Tympanic membrane is erythematous  Tympanic membrane is not injected  Left Ear: Ear canal and external ear normal  There is no impacted cerumen  Tympanic membrane is erythematous  Tympanic membrane is not injected  Nose: No congestion or rhinorrhea  Right Sinus: No maxillary sinus tenderness or frontal sinus tenderness  Left Sinus: No maxillary sinus tenderness or frontal sinus tenderness  Mouth/Throat:      Lips: Pink  Mouth: Mucous membranes are dry  Pharynx: Oropharynx is clear  Uvula midline  Posterior oropharyngeal erythema present  No pharyngeal swelling, oropharyngeal exudate or uvula swelling        Tonsils: No tonsillar exudate  1+ on the right  1+ on the left  Cardiovascular:      Rate and Rhythm: Normal rate  Pulses: Normal pulses  Heart sounds: Normal heart sounds, S1 normal and S2 normal  No murmur heard  Pulmonary:      Effort: Pulmonary effort is normal  No respiratory distress  Breath sounds: Normal breath sounds and air entry  No stridor  No wheezing, rhonchi or rales  Abdominal:      General: Abdomen is flat  Bowel sounds are normal       Palpations: Abdomen is soft  Tenderness: There is generalized abdominal tenderness  Musculoskeletal:      Cervical back: Neck supple  Lymphadenopathy:      Cervical: No cervical adenopathy  Skin:     General: Skin is warm  Capillary Refill: Capillary refill takes less than 2 seconds  Neurological:      Mental Status: She is alert  Psychiatric:         Mood and Affect: Mood normal          Behavior: Behavior normal          Thought Content:  Thought content normal          Judgment: Judgment normal

## 2022-04-17 NOTE — ED PROVIDER NOTES
History  Chief Complaint   Patient presents with    Vomiting    Sore Throat     A 33year-old patient, proximally 8 weeks gestation, presenting with rhinorrhea, sore throat, nasal congestion, nausea, vomiting, diarrhea  Patient reports nausea vomiting pregnancy, however last several days she also developed a sore throat, rhinorrhea and upper respiratory symptoms  She has 2 children, her younger which has a fever and similar symptoms  Patient was seen and cared  She describes mild upper abdominal pain which she describes as muscle soreness from vomiting  She denies pelvic pain, cramping, bleeding or discharge or urinary symptoms          Prior to Admission Medications   Prescriptions Last Dose Informant Patient Reported? Taking?   fluticasone (FLONASE) 50 mcg/act nasal spray   No No   Si sprays into each nostril daily      Facility-Administered Medications: None       Past Medical History:   Diagnosis Date    Allergic     Dust allergy     Gestational diabetes     Shellfish allergy        History reviewed  No pertinent surgical history  Family History   Problem Relation Age of Onset    No Known Problems Mother     No Known Problems Father      I have reviewed and agree with the history as documented  E-Cigarette/Vaping     E-Cigarette/Vaping Substances     Social History     Tobacco Use    Smoking status: Never Smoker    Smokeless tobacco: Never Used   Substance Use Topics    Alcohol use: Yes     Comment: OCCASIONAL    Drug use: No       Review of Systems   Constitutional: Negative for chills and fever  HENT: Positive for congestion, rhinorrhea and sore throat  Negative for nosebleeds  Eyes: Negative for pain and visual disturbance  Respiratory: Negative for cough and wheezing  Cardiovascular: Negative for chest pain and leg swelling  Gastrointestinal: Positive for diarrhea, nausea and vomiting  Negative for abdominal distention     Genitourinary: Negative for dysuria and frequency  Musculoskeletal: Negative for back pain and joint swelling  Skin: Negative for rash and wound  Neurological: Negative for weakness and numbness  Psychiatric/Behavioral: Negative for decreased concentration and suicidal ideas  Physical Exam  Physical Exam  Vitals and nursing note reviewed  Constitutional:       Appearance: She is well-developed  HENT:      Head: Normocephalic and atraumatic  Eyes:      Conjunctiva/sclera: Conjunctivae normal       Pupils: Pupils are equal, round, and reactive to light  Neck:      Trachea: No tracheal deviation  Cardiovascular:      Rate and Rhythm: Normal rate and regular rhythm  Heart sounds: Normal heart sounds  No murmur heard  Pulmonary:      Effort: Pulmonary effort is normal  No respiratory distress  Breath sounds: Normal breath sounds  No wheezing or rales  Abdominal:      General: Bowel sounds are normal  There is no distension  Palpations: Abdomen is soft  Tenderness: There is no abdominal tenderness  Musculoskeletal:         General: No deformity  Cervical back: Normal range of motion and neck supple  Skin:     General: Skin is warm and dry  Capillary Refill: Capillary refill takes less than 2 seconds  Neurological:      Mental Status: She is alert and oriented to person, place, and time  Sensory: No sensory deficit     Psychiatric:         Judgment: Judgment normal          Vital Signs  ED Triage Vitals [04/17/22 1025]   Temperature Pulse Respirations Blood Pressure SpO2   97 9 °F (36 6 °C) 90 20 123/79 100 %      Temp Source Heart Rate Source Patient Position - Orthostatic VS BP Location FiO2 (%)   Temporal Monitor Sitting Right arm --      Pain Score       4           Vitals:    04/17/22 1025 04/17/22 1257   BP: 123/79 107/65   Pulse: 90 90   Patient Position - Orthostatic VS: Sitting Lying         Visual Acuity      ED Medications  Medications   sodium chloride 0 9 % bolus 1,000 mL (0 mL Intravenous Stopped 4/17/22 1302)   ondansetron (ZOFRAN) injection 4 mg (4 mg Intravenous Given 4/17/22 1100)   acetaminophen (TYLENOL) tablet 650 mg (650 mg Oral Given 4/17/22 1112)       Diagnostic Studies  Results Reviewed     Procedure Component Value Units Date/Time    UA w Reflex to Microscopic w Reflex to Culture [[id number]]  (Abnormal) Collected: 04/17/22 1218    Lab Status: Final result Specimen: Urine, Clean Catch Updated: 04/17/22 1233     Color, UA Yellow     Clarity, UA Clear     Specific Gravity, UA 1 015     pH, UA 6 0     Leukocytes, UA Negative     Nitrite, UA Negative     Protein, UA Negative mg/dl      Glucose, UA Negative mg/dl      Ketones, UA 40 (2+) mg/dl      Urobilinogen, UA 0 2 E U /dl      Bilirubin, UA Negative     Blood, UA Negative     URINE COMMENT --    Urine culture [[id number]] Collected: 04/17/22 1218    Lab Status: In process Specimen: Urine, Clean Catch Updated: 04/17/22 1233    COVID/FLU/RSV [[id number]]  (Normal) Collected: 04/17/22 1105    Lab Status: Final result Specimen: Nares from Nose Updated: 04/17/22 1147     SARS-CoV-2 Negative     INFLUENZA A PCR Negative     INFLUENZA B PCR Negative     RSV PCR Negative    Narrative:      FOR PEDIATRIC PATIENTS - copy/paste COVID Guidelines URL to browser: [email or web address]    SARS-CoV-2 assay is a Nucleic Acid Amplification assay intended for the  qualitative detection of nucleic acid from SARS-CoV-2 in nasopharyngeal  swabs  Results are for the presumptive identification of SARS-CoV-2 RNA  Positive results are indicative of infection with SARS-CoV-2, the virus  causing COVID-19, but do not rule out bacterial infection or co-infection  with other viruses  Laboratories within the [place] and its  territories are required to report all positive results to the appropriate  public health authorities   Negative results do not preclude SARS-CoV-2  infection and should not be used as the sole basis for treatment or other  patient management decisions  Negative results must be combined with  clinical observations, patient history, and epidemiological information  This test has not been FDA cleared or approved  This test has been authorized by FDA under an Emergency Use Authorization  (EUA)  This test is only authorized for the duration of time the  declaration that circumstances exist justifying the authorization of the  emergency use of an in vitro diagnostic tests for detection of SARS-CoV-2  virus and/or diagnosis of COVID-19 infection under section 564(b)(1) of  the Act, 21 U  S C  428XWX-5(E)(9), unless the authorization is terminated  or revoked sooner  The test has been validated but independent review by FDA  and CLIA is pending  Test performed using [employer] GeneXpert: This RT-PCR assay targets N2,  a region unique to SARS-CoV-2  A conserved region in the E-gene was chosen  for pan-Sarbecovirus detection which includes SARS-CoV-2      Comprehensive metabolic panel [[id number]]  (Abnormal) Collected: 04/17/22 1105    Lab Status: Final result Specimen: Blood from Arm, Left Updated: 04/17/22 1133     Sodium 134 mmol/L      Potassium 3 5 mmol/L      Chloride 100 mmol/L      CO2 24 mmol/L      ANION GAP 10 mmol/L      BUN 5 mg/dL      Creatinine 0 49 mg/dL      Glucose 81 mg/dL      Calcium 9 6 mg/dL      AST 41 U/L      ALT 62 U/L      Alkaline Phosphatase 61 U/L      Total Protein 7 5 g/dL      Albumin 4 4 g/dL      Total Bilirubin 0 75 mg/dL      eGFR 128 ml/min/1 73sq m     Narrative:      [place] guidelines for Chronic Kidney Disease (CKD):     Stage 1 with normal or high GFR (GFR > 90 mL/min/1 73 square meters)    Stage 2 Mild CKD (GFR = 60-89 mL/min/1 73 square meters)    Stage 3A Moderate CKD (GFR = 45-59 mL/min/1 73 square meters)    Stage 3B Moderate CKD (GFR = 30-44 mL/min/1 73 square meters)    Stage 4 Severe CKD (GFR = 15-29 mL/min/1 73 square meters)    Stage 5 End Stage CKD (GFR <15 mL/min/1 73 square meters)  Note: GFR calculation is accurate only with a steady state creatinine    Magnesium [[id number]]  (Normal) Collected: 04/17/22 1105    Lab Status: Final result Specimen: Blood from Arm, Left Updated: 04/17/22 1133     Magnesium 1 9 mg/dL                  No orders to display              Procedures  Procedures         ED Course                               SBIRT 22yo+      Most Recent Value   SBIRT (24 yo +)    In order to provide better care to our patients, we are screening all of our patients for alcohol and drug use  Would it be okay to ask you these screening questions? Yes Filed at: 04/17/2022 1105   Initial Alcohol Screen: US AUDIT-C     1  How often do you have a drink containing alcohol? 0 Filed at: 04/17/2022 1105   2  How many drinks containing alcohol do you have on a typical day you are drinking? 0 Filed at: 04/17/2022 1105   3a  Male UNDER 65: How often do you have five or more drinks on one occasion? 0 Filed at: 04/17/2022 1105   3b  FEMALE Any Age, or MALE 65+: How often do you have 4 or more drinks on one occassion? 0 Filed at: 04/17/2022 1105   Audit-C Score 0 Filed at: 04/17/2022 1105   CHANELLE: How many times in the past year have you    Used an illegal drug or used a prescription medication for non-medical reasons? Never Filed at: 04/17/2022 1105                    MDM  Number of Diagnoses or Management Options  Nausea and vomiting during pregnancy: new and requires workup  Nausea and vomiting: new and requires workup  Viral URI: new and requires workup  Diagnosis management comments: Patient presents with signs and symptoms consistent with gastroenteritis or flu-like illness, I also suspect some underlying degree of hyperemesis given the duration of her symptoms  Additionally considered if this was causing nasopharyngeal irritation increased mucus production however this does not explain the diarrhea      Her abdominal pain is muscular in nature, not concerning for ectopic pregnancy or acute infection    Patient tolerating p o  after IV fluid and Zofran, labs overall reassuring, patient is comfortable with discharge with  Symptom control       Amount and/or Complexity of Data Reviewed  Review and summarize past medical records: yes    Risk of Complications, Morbidity, and/or Mortality  Presenting problems: high  Management options: high        Disposition  Final diagnoses:   Nausea and vomiting   Nausea and vomiting during pregnancy   Viral URI     Time reflects when diagnosis was documented in both MDM as applicable and the Disposition within this note     Time User Action Codes Description Comment    4/17/2022 12:38 PM Hetal Ronak Add [R11 2] Nausea and vomiting     4/17/2022  1:04 PM Hetal Ronak Add [O21 9] Nausea and vomiting during pregnancy     4/17/2022  1:04 PM Hetal Ronak Add [J06 9] Viral URI       ED Disposition     ED Disposition Condition Date/Time Comment    Discharge Stable Sun Apr 17, 2022 12:38 PM Edgar Fisher discharge to home/self care              Follow-up Information     Follow up With Specialties Details Why [address],  Obstetrics and Gynecology, Obstetrics, Gynecology Schedule an appointment as soon as possible for a visit   [address] [place] [id number]  [phone]            Discharge Medication List as of 4/17/2022  1:05 PM      START taking these medications    Details   doxylamine (UNISOM) 25 MG tablet Take 0 5 tablets (12 5 mg total) by mouth daily as needed for nausea, Starting Sun 4/17/2022, Normal      ondansetron (Zofran ODT) 4 mg disintegrating tablet Take 1 tablet (4 mg total) by mouth every 6 (six) hours as needed for nausea or vomiting, Starting Sun 4/17/2022, Normal      pyridoxine (B-6) 25 MG tablet Take 0 5 tablets (12 5 mg total) by mouth daily as needed (nausea), Starting Sun 4/17/2022, Normal CONTINUE these medications which have NOT CHANGED    Details   fluticasone (FLONASE) 50 mcg/act nasal spray 2 sprays into each nostril daily, Starting Mon 1/20/2020, Until Wed 2/19/2020, Normal             No discharge procedures on file      PDMP Review     None          ED Provider  Electronically Signed by           Kashif Holcomb DO  04/17/22 5015

## 2022-04-17 NOTE — PATIENT INSTRUCTIONS
Proceed to the ER for further evaluation     [address] [facility] Alexa Amaro, [address]

## 2022-04-18 LAB
FLUAV RNA RESP QL NAA+PROBE: NEGATIVE
FLUBV RNA RESP QL NAA+PROBE: NEGATIVE
SARS-COV-2 RNA RESP QL NAA+PROBE: NEGATIVE

## 2022-04-19 LAB
BACTERIA THROAT CULT: ABNORMAL
BACTERIA UR CULT: NORMAL

## 2022-04-20 ENCOUNTER — OFFICE VISIT (OUTPATIENT)
Dept: URGENT CARE | Facility: CLINIC | Age: 32
End: 2022-04-20
Payer: COMMERCIAL

## 2022-04-20 VITALS
WEIGHT: 133 LBS | TEMPERATURE: 98.5 F | HEART RATE: 104 BPM | BODY MASS INDEX: 22.83 KG/M2 | OXYGEN SATURATION: 98 % | RESPIRATION RATE: 18 BRPM

## 2022-04-20 DIAGNOSIS — B95.4 GROUP G STREPTOCOCCAL INFECTION: Primary | ICD-10-CM

## 2022-04-20 PROCEDURE — 99213 OFFICE O/P EST LOW 20 MIN: CPT | Performed by: PHYSICIAN ASSISTANT

## 2022-04-20 RX ORDER — AMOXICILLIN 875 MG/1
875 TABLET, COATED ORAL 2 TIMES DAILY
Qty: 20 TABLET | Refills: 0 | Status: SHIPPED | OUTPATIENT
Start: 2022-04-20 | End: 2022-04-30

## 2022-04-20 RX ORDER — FLUTICASONE PROPIONATE 50 MCG
1 SPRAY, SUSPENSION (ML) NASAL DAILY
Qty: 18.2 ML | Refills: 0 | Status: SHIPPED | OUTPATIENT
Start: 2022-04-20 | End: 2022-07-12 | Stop reason: CLARIF

## 2022-04-20 NOTE — PROGRESS NOTES
[address] [facility]    NAME: Rebecca Walter is a 33 y o  female  : 1990    MRN: [id number]  DATE: 2022  TIME: 10:12 AM    Assessment and Plan   Group G streptococcal infection [B95 4]  1  Group G streptococcal infection  amoxicillin (AMOXIL) 875 mg tablet    fluticasone (FLONASE) 50 mcg/act nasal spray       Patient Instructions     Patient Instructions   Flonase, robitussin as needed  amox as directed  If not improving, follow up with pcp  Chief Complaint     Chief Complaint   Patient presents with    Sinus Problem     was seen here on  went to ER no betterwith sinus pressure        History of Present Illness   75-year-old female here with complaint of worsening sinus congestion, sore throat and a dry cough  Patient feels like she is getting a sinus infection  Has bad headache and sinus pressure  Throat culture done a few days ago was positive for group G strep  She was seen in the emergency room given fluids and Zofran which did help some her symptoms but they are not completely resolved  She is currently 8 weeks pregnant  Review of Systems   Review of Systems   Constitutional: Negative for appetite change, chills and fever  HENT: Positive for congestion, postnasal drip, sinus pressure and sore throat  Negative for ear discharge, ear pain, facial swelling and sneezing  Respiratory: Positive for cough  Negative for shortness of breath and wheezing  Neurological: Positive for headaches         Current Medications     Current Outpatient Medications:     amoxicillin (AMOXIL) 875 mg tablet, Take 1 tablet (875 mg total) by mouth 2 (two) times a day for 10 days, Disp: 20 tablet, Rfl: 0    doxylamine (UNISOM) 25 MG tablet, Take 0 5 tablets (12 5 mg total) by mouth daily as needed for nausea, Disp: 30 tablet, Rfl: 0    fluticasone (FLONASE) 50 mcg/act nasal spray, 1 spray into each nostril daily, Disp: 18 2 mL, Rfl: 0    ondansetron (Zofran ODT) 4 mg disintegrating tablet, Take 1 tablet (4 mg total) by mouth every 6 (six) hours as needed for nausea or vomiting, Disp: 20 tablet, Rfl: 0    pyridoxine (B-6) 25 MG tablet, Take 0 5 tablets (12 5 mg total) by mouth daily as needed (nausea), Disp: 30 tablet, Rfl: 0    Current Allergies     Allergies as of 04/20/2022 - Reviewed 04/20/2022   Allergen Reaction Noted    Other  06/18/2014    Shellfish allergy - food allergy  06/18/2014          The following portions of the patient's history were reviewed and updated as appropriate: allergies, current medications, past family history, past medical history, past social history, past surgical history and problem list    Past Medical History:   Diagnosis Date    Allergic     Dust allergy     Gestational diabetes     Shellfish allergy      History reviewed  No pertinent surgical history  Family History   Problem Relation Age of Onset    No Known Problems Mother     No Known Problems Father      Social History     Socioeconomic History    Marital status: /Civil Union     Spouse name: Not on file    Number of children: Not on file    Years of education: Not on file    Highest education level: Not on file   Occupational History    Not on file   Tobacco Use    Smoking status: Never Smoker    Smokeless tobacco: Never Used   Substance and Sexual Activity    Alcohol use: Yes     Comment: OCCASIONAL    Drug use: No    Sexual activity: Never   Other Topics Concern    Not on file   Social History Narrative    Not on file     Social Determinants of Health     Financial Resource Strain: Not on file   Food Insecurity: Not on file   Transportation Needs: Not on file   Physical Activity: Not on file   Stress: Not on file   Social Connections: Not on file   Intimate Partner Violence: Not on file   Housing Stability: Not on file     Medications have been verified      Objective   Pulse 104   Temp 98 5 °F (36 9 °C)   Resp 18   Wt 60 3 kg (133 lb)   SpO2 98%   BMI 22 83 kg/m² Physical Exam   Physical Exam  Vitals and nursing note reviewed  Constitutional:       General: She is not in acute distress  Appearance: She is well-developed  HENT:      Head: Normocephalic and atraumatic  Right Ear: Tympanic membrane normal       Left Ear: Tympanic membrane normal       Nose: Congestion present  No mucosal edema  Right Sinus: No maxillary sinus tenderness or frontal sinus tenderness  Left Sinus: No maxillary sinus tenderness or frontal sinus tenderness  Mouth/Throat:      Pharynx: Posterior oropharyngeal erythema present  No oropharyngeal exudate  Eyes:      Conjunctiva/sclera: Conjunctivae normal    Cardiovascular:      Rate and Rhythm: Normal rate and regular rhythm  Heart sounds: Normal heart sounds  No murmur heard  Pulmonary:      Effort: Pulmonary effort is normal       Breath sounds: Normal breath sounds

## 2022-04-21 ENCOUNTER — TELEPHONE (OUTPATIENT)
Dept: URGENT CARE | Facility: CLINIC | Age: 32
End: 2022-04-21

## 2022-07-12 ENCOUNTER — OFFICE VISIT (OUTPATIENT)
Dept: FAMILY MEDICINE CLINIC | Facility: CLINIC | Age: 32
End: 2022-07-12
Payer: COMMERCIAL

## 2022-07-12 VITALS
OXYGEN SATURATION: 99 % | DIASTOLIC BLOOD PRESSURE: 68 MMHG | SYSTOLIC BLOOD PRESSURE: 104 MMHG | HEIGHT: 64 IN | TEMPERATURE: 98.9 F | HEART RATE: 89 BPM | BODY MASS INDEX: 22.2 KG/M2 | WEIGHT: 130 LBS

## 2022-07-12 DIAGNOSIS — Z76.89 ESTABLISHING CARE WITH NEW DOCTOR, ENCOUNTER FOR: Primary | ICD-10-CM

## 2022-07-12 DIAGNOSIS — Z12.4 CERVICAL CANCER SCREENING: ICD-10-CM

## 2022-07-12 DIAGNOSIS — Z23 NEED FOR VIRAL IMMUNIZATION: ICD-10-CM

## 2022-07-12 PROCEDURE — 99213 OFFICE O/P EST LOW 20 MIN: CPT | Performed by: FAMILY MEDICINE

## 2022-07-12 PROCEDURE — 90471 IMMUNIZATION ADMIN: CPT

## 2022-07-12 PROCEDURE — 3725F SCREEN DEPRESSION PERFORMED: CPT | Performed by: FAMILY MEDICINE

## 2022-07-12 PROCEDURE — 90707 MMR VACCINE SC: CPT

## 2022-07-12 RX ORDER — NORGESTIMATE AND ETHINYL ESTRADIOL 7DAYSX3 LO
1 KIT ORAL DAILY
COMMUNITY

## 2022-07-12 RX ORDER — ACETAMINOPHEN AND CODEINE PHOSPHATE 120; 12 MG/5ML; MG/5ML
SOLUTION ORAL
COMMUNITY
Start: 2022-07-08 | End: 2022-07-12 | Stop reason: CLARIF

## 2022-07-12 NOTE — PROGRESS NOTES
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  Cervical cancer screening  Comments:  She is up-to-date, follows with an independent OB-GYN  Need for viral immunization  -     MMR VACCINE SQ    Other orders  -     Discontinue: norethindrone (MICRONOR) 0 35 MG tablet  -     norgestimate-ethinyl estradiol (ORTHO TRI-CYCLEN LO) 0 18/0 215/0 25 MG-25 MCG per tablet; Take 1 tablet by mouth daily          Subjective:     Patient ID: Loli Savage is a 33 y o  female  In office encounter to establish care with us  Occupation: , transitioning to nursing school  Does not smoke tobacco  Social drinker  Denies medical/recreational MJ  No past medical history  Stays physically active, tries to eat a well balanced diet  She does have a dentist, follows regularly  Does not use glasses or contact lenses  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 well as paperwork to clear her for duty  She did bring labwork in as well as titers for immunity for requested viral pathogens  Review of Systems   Constitutional: Negative for chills and fever  HENT: Negative for ear pain and sore throat  Eyes: Negative for pain and visual disturbance  Respiratory: Negative for cough and shortness of breath  Cardiovascular: Negative for chest pain and palpitations  Gastrointestinal: Negative for abdominal pain and vomiting  Genitourinary: Negative for dysuria and hematuria  Musculoskeletal: Negative for arthralgias and back pain  Skin: Negative for color change and rash  Neurological: Negative for seizures and syncope  Psychiatric/Behavioral: Negative for sleep disturbance  All other systems reviewed and are negative  Objective:     Physical Exam  Vitals reviewed     Constitutional: General: She is not in acute distress  Appearance: Normal appearance  She is not ill-appearing  HENT:      Head: Normocephalic and atraumatic  Right Ear: Tympanic membrane, ear canal and external ear normal       Left Ear: Tympanic membrane, ear canal and external ear normal       Nose: Nose normal       Mouth/Throat:      Mouth: Mucous membranes are moist       Pharynx: Oropharynx is clear  No oropharyngeal exudate or posterior oropharyngeal erythema  Eyes:      Extraocular Movements: Extraocular movements intact  Conjunctiva/sclera: Conjunctivae normal       Pupils: Pupils are equal, round, and reactive to light  Cardiovascular:      Rate and Rhythm: Normal rate and regular rhythm  Pulses: Normal pulses  Heart sounds: Normal heart sounds  No murmur heard  No friction rub  No gallop  Pulmonary:      Effort: Pulmonary effort is normal       Breath sounds: Normal breath sounds  No wheezing, rhonchi or rales  Abdominal:      General: There is no distension  Tenderness: There is no abdominal tenderness  Musculoskeletal:         General: Normal range of motion  Cervical back: Normal range of motion  Right lower leg: No edema  Left lower leg: No edema  Skin:     General: Skin is warm  Capillary Refill: Capillary refill takes less than 2 seconds  Neurological:      General: No focal deficit present  Mental Status: She is alert and oriented to person, place, and time  Mental status is at baseline  Psychiatric:         Mood and Affect: Mood normal          Behavior: Behavior normal          Thought Content:  Thought content normal          Judgment: Judgment normal            Wt Readings from Last 3 Encounters:   07/12/22 59 kg (130 lb)   04/20/22 60 3 kg (133 lb)   04/17/22 60 3 kg (133 lb)     Temp Readings from Last 3 Encounters:   07/12/22 98 9 °F (37 2 °C) (Tympanic)   04/20/22 98 5 °F (36 9 °C)   04/17/22 97 9 °F (36 6 °C) (Temporal)     BP Readings from Last 3 Encounters:   07/12/22 104/68   04/17/22 107/65   01/20/20 117/69     Pulse Readings from Last 3 Encounters:   07/12/22 89   04/20/22 104   04/17/22 90

## 2022-07-13 ENCOUNTER — TELEPHONE (OUTPATIENT)
Dept: ADMINISTRATIVE | Facility: OTHER | Age: 32
End: 2022-07-13

## 2022-07-13 NOTE — TELEPHONE ENCOUNTER
Upon review of the In Basket request we were able to locate, review, and update the patient chart as requested for Pap Smear (HPV) aka Cervical Cancer Screening  Any additional questions or concerns should be emailed to the Practice Liaisons via [email or web address] email, please do not reply via In Basket      Thank you  Tee Rush MA

## 2022-07-13 NOTE — TELEPHONE ENCOUNTER
----- Message from Aliza Carrasco sent at 7/12/2022  1:46 PM EDT -----  Regarding: pap smear  07/12/22 1:46 PM    Hello, our patient Fuad Castano has had Pap Smear (HPV) aka Cervical Cancer Screening completed/performed  Please assist in updating the patient chart by pulling the Care Everywhere (CE) document  The date of service is 7/2/2021       Thank you,  Aliza [facility]

## 2022-09-13 ENCOUNTER — OFFICE VISIT (OUTPATIENT)
Dept: URGENT CARE | Facility: CLINIC | Age: 32
End: 2022-09-13
Payer: COMMERCIAL

## 2022-09-13 VITALS
WEIGHT: 130 LBS | TEMPERATURE: 98 F | HEART RATE: 85 BPM | RESPIRATION RATE: 20 BRPM | OXYGEN SATURATION: 99 % | BODY MASS INDEX: 22.31 KG/M2

## 2022-09-13 DIAGNOSIS — R05.1 ACUTE COUGH: ICD-10-CM

## 2022-09-13 DIAGNOSIS — J06.9 VIRAL UPPER RESPIRATORY INFECTION: Primary | ICD-10-CM

## 2022-09-13 DIAGNOSIS — J02.8 ACUTE PHARYNGITIS DUE TO OTHER SPECIFIED ORGANISMS: ICD-10-CM

## 2022-09-13 LAB
S PYO AG THROAT QL: NEGATIVE
SARS-COV-2 AG UPPER RESP QL IA: NEGATIVE
VALID CONTROL: NORMAL

## 2022-09-13 PROCEDURE — 99213 OFFICE O/P EST LOW 20 MIN: CPT | Performed by: NURSE PRACTITIONER

## 2022-09-13 PROCEDURE — 87811 SARS-COV-2 COVID19 W/OPTIC: CPT | Performed by: NURSE PRACTITIONER

## 2022-09-13 PROCEDURE — 87070 CULTURE OTHR SPECIMN AEROBIC: CPT | Performed by: NURSE PRACTITIONER

## 2022-09-13 PROCEDURE — 87880 STREP A ASSAY W/OPTIC: CPT | Performed by: NURSE PRACTITIONER

## 2022-09-13 NOTE — PROGRESS NOTES
[address] [facility]        NAME: Ephraim Cantor is a 33 y o  female  : 1990    MRN: [id number]  DATE: 2022  TIME: 9:47 AM    Assessment and Plan   Viral upper respiratory infection [J06 9]  1  Viral upper respiratory infection     2  Acute pharyngitis due to other specified organisms  POCT rapid strepA    Throat culture   3  Acute cough  Poct Covid 19 Rapid Antigen Test         Patient Instructions       Follow up with PCP in 3-5 days  Proceed to  ER if symptoms worsen  Your rapid strep A is negative  A throat culture has been sent and will be resulted in 3-4 days  You are to download Cater to ut for the results  You will be notified if the results are + and an antibiotic will be called in for you  Warm salt water gargles 4 x daily  Take OTC product symptom relief  Retest yourself in 3 days for covid  Covid today is negative   Follow up with your PCP             Wear a mask x 10 days  you may work as long as you are fever free  Go to the ED if symptoms worsen          Chief Complaint     Chief Complaint   Patient presents with    Sore Throat    Cold Like Symptoms    Headache         History of Present Illness       This is a 33year old female who states developed cold symptoms, sorethroat, headache, body aches and chills last night  She did not covid test herself  She states she is covid vaccinated and had covid in May  She states that her entire house is ill with same symptoms  She is a nursing student  She took tylenol cold and sinus last night  Denies fevers, chills, n/v/d  Denies pregnancy  Review of Systems   Review of Systems   Constitutional: Positive for chills  HENT: Positive for congestion and sore throat  Eyes: Negative  Respiratory: Positive for cough  Cardiovascular: Negative  Gastrointestinal: Negative  Endocrine: Negative  Genitourinary: Negative  Musculoskeletal: Positive for myalgias  Skin: Negative  Allergic/Immunologic: Negative  Neurological: Positive for headaches  Hematological: Negative  Psychiatric/Behavioral: Negative  Current Medications       Current Outpatient Medications:     norgestimate-ethinyl estradiol (ORTHO TRI-CYCLEN LO) 0 18/0 215/0 25 MG-25 MCG per tablet, Take 1 tablet by mouth daily, Disp: , Rfl:     Current Allergies     Allergies as of 09/13/2022 - Reviewed 09/13/2022   Allergen Reaction Noted    Other  06/18/2014    Shellfish allergy - food allergy  06/18/2014            The following portions of the patient's history were reviewed and updated as appropriate: allergies, current medications, past family history, past medical history, past social history, past surgical history and problem list      Past Medical History:   Diagnosis Date    Allergic     Dust allergy     Gestational diabetes     Shellfish allergy        History reviewed  No pertinent surgical history  Family History   Problem Relation Age of Onset    No Known Problems Mother     No Known Problems Father          Medications have been verified  Objective   Pulse 85   Temp 98 °F (36 7 °C)   Resp 20   Wt 59 kg (130 lb)   LMP 08/16/2022 (Approximate)   SpO2 99%   BMI 22 31 kg/m²   Patient's last menstrual period was 08/16/2022 (approximate)  Physical Exam     Physical Exam  Vitals and nursing note reviewed  Constitutional:       General: She is not in acute distress  Appearance: She is well-developed and normal weight  She is not ill-appearing, toxic-appearing or diaphoretic  HENT:      Head: Normocephalic and atraumatic  Right Ear: Tympanic membrane and ear canal normal       Left Ear: Tympanic membrane and ear canal normal       Nose: Congestion present  No rhinorrhea  Mouth/Throat:      Mouth: Mucous membranes are moist  No oral lesions  Pharynx: Uvula midline  Posterior oropharyngeal erythema present   No pharyngeal swelling, oropharyngeal exudate or uvula swelling  Tonsils: No tonsillar exudate or tonsillar abscesses  Comments: Mild erythema posterior oropharyngeal rim   Eyes:      Conjunctiva/sclera: Conjunctivae normal       Pupils: Pupils are equal, round, and reactive to light  Cardiovascular:      Rate and Rhythm: Normal rate and regular rhythm  Heart sounds: Normal heart sounds  No murmur heard  Pulmonary:      Effort: Pulmonary effort is normal       Breath sounds: Normal breath sounds  Abdominal:      General: Bowel sounds are normal       Palpations: Abdomen is soft  Musculoskeletal:      Cervical back: Normal range of motion  Skin:     General: Skin is warm and dry  Capillary Refill: Capillary refill takes less than 2 seconds  Neurological:      General: No focal deficit present  Mental Status: She is alert and oriented to person, place, and time  Psychiatric:         Mood and Affect: Mood normal          Behavior: Behavior normal            Pt requesting antibiotics as she states she is "prone to sinus infections and I know my body"  Informed pt that physical assessment is negative for infections today, no documented fever  Informed her that if throat culture is + she will get abx, instructed if she gets worse later in the week to call and we can discuss  Other option is to follow up with PCP as should always be done when seen in UC or ED  Pt verbalizes understanding

## 2022-09-13 NOTE — PATIENT INSTRUCTIONS
Your rapid strep A is negative  A throat culture has been sent and will be resulted in 3-4 days  You are to download [email or web address] for the results  You will be notified if the results are + and an antibiotic will be called in for you  Warm salt water gargles 4 x daily  Take OTC product symptom relief  Retest yourself in 3 days for covid  Covid today is negative   Follow up with your PCP             Wear a mask x 10 days  you may work as long as you are fever free    Go to the ED if symptoms worsen

## 2022-09-15 LAB — BACTERIA THROAT CULT: NORMAL

## 2023-07-13 ENCOUNTER — TELEPHONE (OUTPATIENT)
Dept: FAMILY MEDICINE CLINIC | Facility: CLINIC | Age: 33
End: 2023-07-13

## 2023-07-13 DIAGNOSIS — Z13.6 SCREENING FOR CARDIOVASCULAR CONDITION: Primary | ICD-10-CM

## 2023-07-13 DIAGNOSIS — Z13.29 SCREENING FOR THYROID DISORDER: ICD-10-CM

## 2023-07-13 NOTE — TELEPHONE ENCOUNTER
Patient called to reschedule her appointment. She is scheduled with you on 7/19/2023 for her annual physical. She wants to know if you can send in yearly labs for her to get done before the appointment? She hasn't had any done in awhile.

## 2023-07-19 ENCOUNTER — OFFICE VISIT (OUTPATIENT)
Dept: FAMILY MEDICINE CLINIC | Facility: CLINIC | Age: 33
End: 2023-07-19
Payer: COMMERCIAL

## 2023-07-19 VITALS
HEART RATE: 80 BPM | TEMPERATURE: 98.2 F | DIASTOLIC BLOOD PRESSURE: 64 MMHG | WEIGHT: 140.38 LBS | OXYGEN SATURATION: 99 % | SYSTOLIC BLOOD PRESSURE: 124 MMHG | BODY MASS INDEX: 24.88 KG/M2 | HEIGHT: 63 IN

## 2023-07-19 DIAGNOSIS — M54.89 MIDLINE BACK PAIN, UNSPECIFIED BACK LOCATION, UNSPECIFIED CHRONICITY: ICD-10-CM

## 2023-07-19 DIAGNOSIS — Z00.00 ANNUAL PHYSICAL EXAM: Primary | ICD-10-CM

## 2023-07-19 PROCEDURE — 99214 OFFICE O/P EST MOD 30 MIN: CPT | Performed by: NURSE PRACTITIONER

## 2023-07-19 PROCEDURE — 99395 PREV VISIT EST AGE 18-39: CPT | Performed by: NURSE PRACTITIONER

## 2023-07-19 RX ORDER — NORETHINDRONE 0.35 MG/1
1 TABLET ORAL DAILY
COMMUNITY
Start: 2023-06-30

## 2023-07-19 NOTE — PROGRESS NOTES
[facility]    NAME: Josephine Leach  AGE: 27 y.o. SEX: female  : 1990     DATE: 2023     Assessment and Plan:     Problem List Items Addressed This Visit    None  Visit Diagnoses     Annual physical exam    -  Primary    Midline back pain, unspecified back location, unspecified chronicity        both cervical and lumbar pain, considering breast reduction. Relevant Orders    Ambulatory Referral to Physical Therapy    Ambulatory Referral to Plastic Surgery            Immunizations and preventive care screenings were discussed with patient today. Appropriate education was printed on patient's after visit summary. Counseling:  Dental Health: discussed importance of regular tooth brushing, flossing, and dental visits. Exercise: the importance of regular exercise/physical activity was discussed. Recommend exercise 3-5 times per week for at least 30 minutes. Depression Screening and Follow-up Plan: Patient was screened for depression during today's encounter. They screened negative with a PHQ-2 score of 0. Return in about 1 year (around 2024) for Annual physical.     Chief Complaint:     Chief Complaint   Patient presents with   • Physical Exam     Would like a chiropractor referral is having back pain       History of Present Illness:     Adult Annual Physical   Patient here for a comprehensive physical exam. The patient reports no problems. Patient has chronic pain in the cervical spine and lower back. Feels like the size of her breasts effects her pain. She wears a DD cup and her breasts pull forward a lot effecting her posture. She does work out at home dong stretched and [employer] exercises. Plans on going back to the gym, She is in nursing school full time right now and she does notice her pain is worse when she is in school and she is pulling lots of patients and pushing.  She does not take anything for it as she does not like to use medications. However, she does use heat/ice and biofreeze as needed. As been considering breast reduction- plasitc surgeon referral given for consult. Also, recommend follow up with PT. Does not wish to f/u unless no improvement. Diet and Physical Activity  Diet/Nutrition: frequent junk food and consuming 3-5 servings of fruits/vegetables daily. Exercise: moderate cardiovascular exercise. Depression Screening  PHQ-2/9 Depression Screening    Little interest or pleasure in doing things: 0 - not at all  Feeling down, depressed, or hopeless: 0 - not at all  PHQ-2 Score: 0  PHQ-2 Interpretation: Negative depression screen       General Health  Sleep: sleeps well and gets 7-8 hours of sleep on average. Hearing: normal - bilateral.  Vision: no vision problems. Dental: regular dental visits, brushes teeth twice daily and flosses teeth occasionally. /GYN Health  Last menstrual period: one month   Contraceptive method: oral contraceptives. History of STDs?: no.     Review of Systems:     Review of Systems   Constitutional: Negative for appetite change, fatigue and unexpected weight change. HENT: Negative for congestion, rhinorrhea, sneezing and sore throat. Eyes: Negative for visual disturbance. Respiratory: Negative for cough, chest tightness, shortness of breath and wheezing. Cardiovascular: Negative for chest pain, palpitations and leg swelling. Gastrointestinal: Negative for abdominal pain, blood in stool, constipation, diarrhea, nausea and vomiting. Genitourinary: Negative for difficulty urinating, dysuria and urgency. Musculoskeletal: Positive for back pain. Negative for arthralgias and joint swelling. Skin: Negative for color change and rash. Neurological: Negative for dizziness, weakness and headaches. Hematological: Negative for adenopathy. Does not bruise/bleed easily.       Past Medical History:     Past Medical History: Diagnosis Date   • Allergic    • Dust allergy    • Gestational diabetes    • Shellfish allergy       Past Surgical History:     History reviewed. No pertinent surgical history. Social History:     Social History     Socioeconomic History   • Marital status: /Civil Union     Spouse name: None   • Number of children: None   • Years of education: None   • Highest education level: None   Occupational History   • None   Tobacco Use   • Smoking status: Never   • Smokeless tobacco: Never   Substance and Sexual Activity   • Alcohol use: Yes     Comment: OCCASIONAL   • Drug use: No   • Sexual activity: Never   Other Topics Concern   • None   Social History Narrative   • None     Social Determinants of Health     Financial Resource Strain: Not on file   Food Insecurity: Not on file   Transportation Needs: Not on file   Physical Activity: Not on file   Stress: Not on file   Social Connections: Not on file   Intimate Partner Violence: Not on file   Housing Stability: Not on file      Family History:     Family History   Problem Relation Age of Onset   • No Known Problems Mother    • No Known Problems Father       Current Medications:     Current Outpatient Medications   Medication Sig Dispense Refill   • Jencycla 0.35 MG tablet Take 1 tablet by mouth daily       No current facility-administered medications for this visit. Allergies: Allergies   Allergen Reactions   • Other      Annotation - [id number]: roaches                                            dust   • Shellfish Allergy - Food Allergy       Physical Exam:     /64 (BP Location: Left arm, Patient Position: Sitting, Cuff Size: Standard)   Pulse 80   Temp 98.2 °F (36.8 °C) (Tympanic)   Ht 5' 3.25" (1.607 m)   Wt 63.7 kg (140 lb 6 oz)   LMP 06/28/2023 (Approximate)   SpO2 99%   Breastfeeding No   BMI 24.67 kg/m²     Physical Exam  Vitals and nursing note reviewed. Constitutional:       General: She is not in acute distress.      Appearance: Normal appearance. She is well-developed and normal weight. HENT:      Head: Normocephalic and atraumatic. Right Ear: Tympanic membrane, ear canal and external ear normal. There is no impacted cerumen. Left Ear: Tympanic membrane, ear canal and external ear normal. There is no impacted cerumen. Nose: Nose normal. No congestion or rhinorrhea. Mouth/Throat:      Mouth: Mucous membranes are moist.      Pharynx: Oropharynx is clear. No oropharyngeal exudate or posterior oropharyngeal erythema. Eyes:      General: No scleral icterus. Right eye: No discharge. Left eye: No discharge. Extraocular Movements: Extraocular movements intact. Conjunctiva/sclera: Conjunctivae normal.      Pupils: Pupils are equal, round, and reactive to light. Cardiovascular:      Rate and Rhythm: Normal rate and regular rhythm. Pulses: Normal pulses. Heart sounds: Normal heart sounds. No murmur heard. Pulmonary:      Effort: Pulmonary effort is normal. No respiratory distress. Breath sounds: Normal breath sounds. No wheezing or rales. Abdominal:      General: Abdomen is flat. Bowel sounds are normal. There is no distension. Palpations: Abdomen is soft. Tenderness: There is no abdominal tenderness. There is no guarding. Musculoskeletal:         General: No swelling. Normal range of motion. Cervical back: Normal range of motion and neck supple. Spasms and tenderness present. No swelling, edema, deformity, signs of trauma, rigidity, torticollis or bony tenderness. Normal range of motion. Thoracic back: Normal.      Lumbar back: No spasms, tenderness or bony tenderness. Normal range of motion. Right lower leg: No edema. Left lower leg: No edema. Lymphadenopathy:      Cervical: No cervical adenopathy. Skin:     General: Skin is warm and dry. Capillary Refill: Capillary refill takes less than 2 seconds.       Findings: No bruising, erythema or lesion. Neurological:      General: No focal deficit present. Mental Status: She is alert and oriented to person, place, and time. Mental status is at baseline. Motor: No weakness. Coordination: Coordination normal.      Gait: Gait normal.   Psychiatric:         Mood and Affect: Mood normal.         Behavior: Behavior normal.         Thought Content:  Thought content normal.         Judgment: Judgment normal.          Carole French, [facility]

## 2023-07-25 ENCOUNTER — EVALUATION (OUTPATIENT)
Dept: PHYSICAL THERAPY | Facility: CLINIC | Age: 33
End: 2023-07-25
Payer: COMMERCIAL

## 2023-07-25 VITALS — DIASTOLIC BLOOD PRESSURE: 69 MMHG | SYSTOLIC BLOOD PRESSURE: 107 MMHG | HEART RATE: 85 BPM

## 2023-07-25 DIAGNOSIS — G89.29 CHRONIC MIDLINE LOW BACK PAIN WITHOUT SCIATICA: Primary | ICD-10-CM

## 2023-07-25 DIAGNOSIS — M54.6 CHRONIC MIDLINE THORACIC BACK PAIN: ICD-10-CM

## 2023-07-25 DIAGNOSIS — G89.29 CHRONIC MIDLINE THORACIC BACK PAIN: ICD-10-CM

## 2023-07-25 DIAGNOSIS — M54.50 CHRONIC MIDLINE LOW BACK PAIN WITHOUT SCIATICA: Primary | ICD-10-CM

## 2023-07-25 PROCEDURE — 97140 MANUAL THERAPY 1/> REGIONS: CPT | Performed by: PHYSICAL THERAPIST

## 2023-07-25 PROCEDURE — 97162 PT EVAL MOD COMPLEX 30 MIN: CPT | Performed by: PHYSICAL THERAPIST

## 2023-07-25 NOTE — PROGRESS NOTES
PT Evaluation     Today's date: 2023  Patient name: Bashir Clements  : 1990  MRN: [id number]  Referring provider: TITUS Heath  Dx:   Encounter Diagnosis     ICD-10-CM    1. Chronic midline low back pain without sciatica  M54.50     G89.29       2. Chronic midline thoracic back pain  M54.6 Ambulatory Referral to Physical Therapy    G89.29     both cervical and lumbar pain, considering breast reduction. Assessment  Assessment details: Bashir Clements is a 27 y.o. female who presents with complaints of chronic midline low back pain without sciatica and chronic midline thoracic back pain. No further referral appears necessary at this time based upon examination results. Patient presents with poor posture and poor core strength and will benefit from skilled PT services to address impairments and maximize function and QOL. Prognosis is good given HEP compliance and PT 2x/wk. Please contact me if you have any questions or recommendations. Thank you for the opportunity to share in Benita's care. Impairments: abnormal or restricted ROM, activity intolerance, impaired physical strength, lacks appropriate home exercise program, pain with function and poor posture   Functional limitations: painful lifting and bending, pain with prolonged sitting or prolonged standingUnderstanding of Dx/Px/POC: good   Prognosis: good    Goals  STGs  1) In 4 weeks patient will report 3 points reduced pain  2) In 4 weeks patient will demonstrate full cervical ROM in all directions without pulling/pain  3) In 4 weeks patient will demonstrate 10 deg improved flexibility in b/l HS  4) IN 4 weeks patient will demonstrate independence with HEP    LTGs  1) In 4-6 weeks patient will demonstrate improved postural awareness with sitting and standing  2) In 4-6 weeks patient will report pain levels no greater than 3/10.   3) In 4-6 weeks patient will demonstrate independence with extensive HEP and readiness to transition to fitness facility independently.      Plan  Patient would benefit from: skilled physical therapy  Referral necessary: No  Planned modality interventions: cryotherapy, thermotherapy: hydrocollator packs, traction and unattended electrical stimulation  Planned therapy interventions: IASTM, joint mobilization, kinesiology taping, manual therapy, massage, abdominal trunk stabilization, balance, balance/weight bearing training, Perez taping, neuromuscular re-education, patient education, postural training, self care, strengthening, stretching, therapeutic activities, therapeutic exercise, functional ROM exercises, flexibility and home exercise program  Frequency: 2x week  Duration in weeks: 6  Plan of Care beginning date: 2023  Plan of Care expiration date: 2023  Treatment plan discussed with: patient        Subjective Evaluation    History of Present Illness  Mechanism of injury: -presents with c/o chronic thoracic and lumbar pain  -she blames the pain on having larger breast size as well as 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  -pain is intermittent  -denies pain that radiates into the limbs  -pain is localized to upper thoracic spine and lower back  -denies N/T   -denies headaches  -symptoms are aggravated with bending, lifting, prolonged sitting, prolonged standing  -pain is improved with sidelying on either side  -has never had any other treatment for this condition, including PT  Patient Goals  Patient goals for therapy: decreased pain and increased strength  Patient goal: improve posture, learn a home/gym program  Pain  Current pain rating: 3  At best pain ratin  At worst pain ratin    Social Support    Employment status: not working (student in nursing school)    Diagnostic Tests  No diagnostic tests performed  Treatments  No previous or current treatments        Objective     Concurrent Complaints  Negative for night pain, disturbed sleep, dizziness, faints, headaches, nausea/motion sickness, tinnitus, trouble swallowing, difficulty breathing, shortness of breath, respiratory pain, visual change, bladder dysfunction, bowel dysfunction and saddle (S4) numbness    Palpation   Left   Tenderness of the levator scapulae, sternocleidomastoid and upper trapezius. Right   Tenderness of the levator scapulae and upper trapezius.      Neurological Testing     Sensation   Cervical/Thoracic   Left   Intact: light touch    Right   Intact: light touch    Lumbar   Left   Intact: light touch    Right   Intact: light touch    Reflexes   Left   Biceps (C5/C6): normal (2+)  Brachioradialis (C6): normal (2+)  Triceps (C7): normal (2+)  Patellar (L4): normal (2+)  Achilles (S1): normal (2+)  Babinski sign: negative  Clonus sign: negative    Right   Biceps (C5/C6): normal (2+)  Brachioradialis (C6): normal (2+)  Triceps (C7): normal (2+)  Patellar (L4): normal (2+)  Achilles (S1): normal (2+)  Babinski sign: negative  Clonus sign: negative    Active Range of Motion   Cervical/Thoracic Spine       Cervical    Flexion:  WFL and with pain  Extension:  WFL and with pain  Left lateral flexion:  WFL and with pain  Right lateral flexion:  WFL and with pain  Left rotation:  WFL and with pain  Right rotation:  WFL and with pain    Thoracic    Flexion:  WFL  Extension:  with pain Restriction level: moderate  Left rotation:  Restriction level: moderate  Right rotation:  with pain Restriction level: moderate    Lumbar   Flexion:  Restriction level: minimal  Extension:  Restriction level: minimal  Left lateral flexion:  WFL  Right lateral flexion:  WFL  Left rotation:  Restriction level: moderate  Right rotation:  Restriction level: moderate    Joint Play     Hypomobile: T1, T2, T4, T5, T6 and T7   Mechanical Assessment    Cervical      Thoracic    Seated extension: repeated movements  Pain location: no change    Lumbar    Standing flexion: repeated movements   Pain location:peripheralized  Pain intensity: worse  Pain level: increased  Standing extension: repeated movements  Pain location: centralized  Pain intensity: worse  Pain level: increased    Strength/Myotome Testing   Cervical Spine     Left   Normal strength    Right   Normal strength    Lumbar   Left   Normal strength    Right   Normal strength    Tests     Lumbar     Left   Negative crossed SLR and passive SLR. Right   Negative crossed SLR and passive SLR. Left Pelvic Girdle/Sacrum   Negative: active SLR test.     Right Pelvic Girdle/Sacrum   Negative: active SLR test.     Left Hip   Negative JUANA and FADIR. Right Hip   Negative JUANA and FADIR. Neuro Exam:     Headaches   Patient reports headaches: No.              Precautions: none  POC exp 9/5/23  HEP Access Code: Guille Horton - provided to patient at  (7/25/23)  Manuals 7/25       PA mobs to thoracic spine GrV KG       Thoracic extension MWM seated KG       PA mobs to lumbar spine        B/l UT and levator scap stretch        Neuro Re-Ed        Seated chin tucks 5" x10       Scap retraction Seated  5" x10       PPT        Supine abdominal bracing with pball        Bridges        Prone ITY        Prone swimmers        TB rows w/cues for scap retract and TrAb bracing        TB pull downs w/cues for scap retract and TrAb bracing                Ther Ex        Upright bike for strength and endurance        HS stretch Supine  30"x3 ea       Piriformis stretch        Thoracic extension over chair W/towel roll  5" x10       UT stretch        Levator stretch                        Ther Activity                                                Modalities                            Access Code: [email or web address]  URL: [email or web address]  Date: 07/25/2023  Prepared by: Pablo Castro    Exercises  - Seated Thoracic Lumbar Extension  - 10 x daily - 7 x weekly - 10 reps - 5 sec hold  - Seated Cervical Retraction  - 10 x daily - 7 x weekly - 10 reps - 5 sec hold  - Seated Scapular Retraction  - 10 x daily - 7 x weekly - 10 reps - 5 sec hold  - Hooklying Hamstring Stretch with Strap  - 2 x daily - 7 x weekly - 3 sets - 3 reps - 30 sec hold

## 2023-07-27 ENCOUNTER — OFFICE VISIT (OUTPATIENT)
Dept: PHYSICAL THERAPY | Facility: CLINIC | Age: 33
End: 2023-07-27
Payer: COMMERCIAL

## 2023-07-27 DIAGNOSIS — G89.29 CHRONIC MIDLINE LOW BACK PAIN WITHOUT SCIATICA: ICD-10-CM

## 2023-07-27 DIAGNOSIS — G89.29 CHRONIC MIDLINE THORACIC BACK PAIN: Primary | ICD-10-CM

## 2023-07-27 DIAGNOSIS — M54.6 CHRONIC MIDLINE THORACIC BACK PAIN: Primary | ICD-10-CM

## 2023-07-27 DIAGNOSIS — M54.50 CHRONIC MIDLINE LOW BACK PAIN WITHOUT SCIATICA: ICD-10-CM

## 2023-07-27 PROCEDURE — 97110 THERAPEUTIC EXERCISES: CPT

## 2023-07-27 PROCEDURE — 97140 MANUAL THERAPY 1/> REGIONS: CPT

## 2023-07-27 PROCEDURE — 97112 NEUROMUSCULAR REEDUCATION: CPT

## 2023-07-27 NOTE — PROGRESS NOTES
Daily Note     Today's date: 2023  Patient name: Raymond Lujan  : 1990  MRN: [id number]  Referring provider: TITUS Mejia  Dx:   Encounter Diagnosis     ICD-10-CM    1. Chronic midline thoracic back pain  M54.6     G89.29       2. Chronic midline low back pain without sciatica  M54.50     G89.29           Start Time: 1300  Stop Time: 1400  Total time in clinic (min): 60 minutes    Subjective: Patient reports she has been sore from positions and exercises last session; she reports low cervical/upper thoracic soreness verses pain. Objective: See treatment diary below      Assessment: Tolerated treatment well. Focussed on education on posture and positions with seemingly good understanding and manual work. Added UT, levator and ppt today with appropriate soreness and sx. Overall patient fatigues quickly. Patient demonstrated fatigue post treatment, exhibited good technique with therapeutic exercises and would benefit from continued PT      Plan: Continue per plan of care.       Precautions: none  POC exp 23  HEP Access Code: Iesha Cowart - provided to patient at  (23)  Manuals       PA mobs to thoracic spine GrV KG       Thoracic extension MWM seated KG       PA mobs to lumbar spine        B/l UT and levator scap stretch  KL, cervical thoraic lumbar ROM, STM      Neuro Re-Ed        Seated chin tucks 5" x10 5" x10      Scap retraction Seated  5" x10 Seated  5" x10      PPT  Supine 3"x10      Supine abdominal bracing with pball        Bridges        Prone ITY        Prone swimmers        TB rows w/cues for scap retract and TrAb bracing        TB pull downs w/cues for scap retract and TrAb bracing                Ther Ex        Upright bike for strength and endurance        HS stretch Supine  30"x3 ea Supine  30"x3 ea      Piriformis stretch        Thoracic extension over chair W/towel roll  5" x10 W/towel roll  5" x10      UT stretch  15"x4      Levator stretch  15"x4 Ther Activity                                                Modalities

## 2023-07-31 ENCOUNTER — OFFICE VISIT (OUTPATIENT)
Dept: PHYSICAL THERAPY | Facility: CLINIC | Age: 33
End: 2023-07-31
Payer: COMMERCIAL

## 2023-07-31 DIAGNOSIS — M54.6 CHRONIC MIDLINE THORACIC BACK PAIN: Primary | ICD-10-CM

## 2023-07-31 DIAGNOSIS — G89.29 CHRONIC MIDLINE LOW BACK PAIN WITHOUT SCIATICA: ICD-10-CM

## 2023-07-31 DIAGNOSIS — G89.29 CHRONIC MIDLINE THORACIC BACK PAIN: Primary | ICD-10-CM

## 2023-07-31 DIAGNOSIS — M54.50 CHRONIC MIDLINE LOW BACK PAIN WITHOUT SCIATICA: ICD-10-CM

## 2023-07-31 PROCEDURE — 97112 NEUROMUSCULAR REEDUCATION: CPT | Performed by: PHYSICAL THERAPIST

## 2023-07-31 PROCEDURE — 97110 THERAPEUTIC EXERCISES: CPT | Performed by: PHYSICAL THERAPIST

## 2023-07-31 PROCEDURE — 97140 MANUAL THERAPY 1/> REGIONS: CPT | Performed by: PHYSICAL THERAPIST

## 2023-07-31 NOTE — PROGRESS NOTES
Daily Note     Today's date: 2023  Patient name: Navneet Richard  : 1990  MRN: [id number]  Referring provider: TITUS Nino  Dx:   Encounter Diagnosis     ICD-10-CM    1. Chronic midline thoracic back pain  M54.6     G89.29       2. Chronic midline low back pain without sciatica  M54.50     G89.29                      Subjective: Patient reports she woke up very stiff this morning. Objective: See treatment diary below      Assessment: Tolerated treatment well. Progressed through outlined POC today without incident. Patient demonstrated fatigue post treatment, exhibited good technique with therapeutic exercises and would benefit from continued PT      Plan: Continue per plan of care. Progress treatment as tolerated.        Precautions: none  POC exp 23  HEP Access Code: Earnie Medici - provided to patient at IE (23)  Manuals      PA mobs to thoracic spine GrV KG  Gr IV  KG     Thoracic extension MWM seated KG       PA mobs to lumbar spine   Gr IV  KG     B/l UT and levator scap stretch  KL, cervical thoraic lumbar ROM, STM KG      Neuro Re-Ed        Seated chin tucks 5" x10 5" x10      Scap retraction Seated  5" x10 Seated  5" x10      PPT  Supine 3"x10 Supine  5" x20     Supine abdominal bracing with pball   Red pball  5" x20     Bridges   5" 2x10     Prone ITY   3" 1x10 ea     Prone swimmers   3" 1x10 ea     TB rows w/cues for scap retract and TrAb bracing        TB pull downs w/cues for scap retract and TrAb bracing                Ther Ex        Upright bike for strength and endurance        UBE for strength   120 rpm x6 mins alt fwd/retro     HS stretch Supine  30"x3 ea Supine  30"x3 ea      Piriformis stretch   Supine  30"x3 ea     Thoracic extension over chair W/towel roll  5" x10 W/towel roll  5" x10      UT stretch  15"x4 15" x4 ea     Levator stretch  15"x4 15" x4 ea                     Ther Activity                                                Modalities

## 2023-08-04 ENCOUNTER — OFFICE VISIT (OUTPATIENT)
Dept: PHYSICAL THERAPY | Facility: CLINIC | Age: 33
End: 2023-08-04
Payer: COMMERCIAL

## 2023-08-04 DIAGNOSIS — M54.6 CHRONIC MIDLINE THORACIC BACK PAIN: Primary | ICD-10-CM

## 2023-08-04 DIAGNOSIS — G89.29 CHRONIC MIDLINE LOW BACK PAIN WITHOUT SCIATICA: ICD-10-CM

## 2023-08-04 DIAGNOSIS — G89.29 CHRONIC MIDLINE THORACIC BACK PAIN: Primary | ICD-10-CM

## 2023-08-04 DIAGNOSIS — M54.50 CHRONIC MIDLINE LOW BACK PAIN WITHOUT SCIATICA: ICD-10-CM

## 2023-08-04 PROCEDURE — 97140 MANUAL THERAPY 1/> REGIONS: CPT

## 2023-08-04 PROCEDURE — 97110 THERAPEUTIC EXERCISES: CPT

## 2023-08-04 PROCEDURE — 97112 NEUROMUSCULAR REEDUCATION: CPT

## 2023-08-04 NOTE — PROGRESS NOTES
Daily Note     Today's date: 2023  Patient name: Pepe Hull  : 1990  MRN: [id number]  Referring provider: TITUS Ivy  Dx:   Encounter Diagnosis     ICD-10-CM    1. Chronic midline thoracic back pain  M54.6     G89.29       2. Chronic midline low back pain without sciatica  M54.50     G89.29                      Subjective: Pt reported waking up "feeling stiff."       Objective: See treatment diary below      Assessment: Tolerated treatment well. Patient demonstrated fatigue post treatment, exhibited good technique with therapeutic exercises and would benefit from continued PT      Plan: Continue per plan of care. Progress treatment as tolerated.        Precautions: none  POC exp 23  HEP Access Code: Bernadine Fleming - provided to patient at IE (23)  Manuals     PA mobs to thoracic spine GrV KG  Gr IV  KG Resume NV    Thoracic extension MWM seated KG       PA mobs to lumbar spine   Gr IV  KG Resume NV    B/l UT and levator scap stretch  KL, cervical thoraic lumbar ROM, STM KG  TM    Neuro Re-Ed        Seated chin tucks 5" x10 5" x10  Seated  5" 2x10    Scap retraction Seated  5" x10 Seated  5" x10  Seated  5" 2x10    PPT  Supine 3"x10 Supine  5" x20 Supine  5" x20    Supine abdominal bracing with pball   Red pball  5" x20 Red pball  5" x20    Bridges   5" 2x10 5" 2x10    Prone ITY   3" 1x10 ea 3" 1x10 ea    Prone swimmers   3" 1x10 ea 3" 1x10 ea    TB rows w/cues for scap retract and TrAb bracing        TB pull downs w/cues for scap retract and TrAb bracing                Ther Ex        Upright bike for strength and endurance        UBE for strength   120 rpm x6 mins alt fwd/retro 120 rpm x6 mins alt fwd/retro    HS stretch Supine  30"x3 ea Supine  30"x3 ea  Supine  30"x3 ea    Piriformis stretch   Supine  30"x3 ea Supine  30"x3 ea    Thoracic extension over chair W/towel roll  5" x10 W/towel roll  5" x10      UT stretch  15"x4 15" x4 ea 15" x4 ea    Levator stretch 15"x4 15" x4 ea 15" x4 ea                    Ther Activity                                                Modalities

## 2023-08-08 ENCOUNTER — OFFICE VISIT (OUTPATIENT)
Dept: PHYSICAL THERAPY | Facility: CLINIC | Age: 33
End: 2023-08-08
Payer: COMMERCIAL

## 2023-08-08 DIAGNOSIS — G89.29 CHRONIC MIDLINE LOW BACK PAIN WITHOUT SCIATICA: ICD-10-CM

## 2023-08-08 DIAGNOSIS — G89.29 CHRONIC MIDLINE THORACIC BACK PAIN: Primary | ICD-10-CM

## 2023-08-08 DIAGNOSIS — M54.6 CHRONIC MIDLINE THORACIC BACK PAIN: Primary | ICD-10-CM

## 2023-08-08 DIAGNOSIS — M54.50 CHRONIC MIDLINE LOW BACK PAIN WITHOUT SCIATICA: ICD-10-CM

## 2023-08-08 PROCEDURE — 97140 MANUAL THERAPY 1/> REGIONS: CPT | Performed by: PHYSICAL THERAPIST

## 2023-08-08 PROCEDURE — 97112 NEUROMUSCULAR REEDUCATION: CPT | Performed by: PHYSICAL THERAPIST

## 2023-08-08 PROCEDURE — 97110 THERAPEUTIC EXERCISES: CPT

## 2023-08-08 NOTE — PROGRESS NOTES
Daily Note     Today's date: 2023  Patient name: Sophy Riddle  : 1990  MRN: [id number]  Referring provider: TITUS Schafer  Dx:   Encounter Diagnosis     ICD-10-CM    1. Chronic midline thoracic back pain  M54.6     G89.29       2. Chronic midline low back pain without sciatica  M54.50     G89.29                      Subjective: Patient reports she is feeling some slight improvements since starting PT. Objective: See treatment diary below      Assessment: Progressed with addition of resistance bands for scap strengthening today. Tolerated treatment well. Patient demonstrated fatigue post treatment, exhibited good technique with therapeutic exercises and would benefit from continued PT      Plan: Continue per plan of care. Progress treatment as tolerated.        Precautions: none  POC exp 23  HEP Access Code: Vijaya Alvarez - provided to patient at IE (23)  Manuals    PA mobs to thoracic spine GrV KG  Gr IV  KG Resume NV Gr IV  KG   Thoracic extension MWM seated KG       PA mobs to lumbar spine   Gr IV  KG Resume NV Gr IV  KG   B/l UT and levator scap stretch  KL, cervical thoraic lumbar ROM, STM KG  TM    Neuro Re-Ed        Seated chin tucks 5" x10 5" x10  Seated  5" 2x10 Seated  5" 2x10   Scap retraction Seated  5" x10 Seated  5" x10  Seated  5" 2x10 Seated  5" 2x10   PPT  Supine 3"x10 Supine  5" x20 Supine  5" x20 Supine 5" x20   Supine abdominal bracing with pball   Red pball  5" x20 Red pball  5" x20 Red pball  5" x20   Bridges   5" 2x10 5" 2x10 5" 2x10   Prone ITY   3" 1x10 ea 3" 1x10 ea 3" 1x10   Prone swimmers   3" 1x10 ea 3" 1x10 ea Resume NV   TB rows w/cues for scap retract and TrAb bracing     Green TB  5" x20   TB pull downs w/cues for scap retract and TrAb bracing     Green TB  5" x20           Ther Ex        Upright bike for strength and endurance        UBE for strength   120 rpm x6 mins alt fwd/retro 120 rpm x6 mins alt fwd/retro 120 rpm x6 mins alt fwd/retro   HS stretch Supine  30"x3 ea Supine  30"x3 ea  Supine  30"x3 ea Supine  30"x3 ea   Piriformis stretch   Supine  30"x3 ea Supine  30"x3 ea Supine 30"x3 ea   Thoracic extension over chair W/towel roll  5" x10 W/towel roll  5" x10   W/towel roll  5" x10   UT stretch  15"x4 15" x4 ea 15" x4 ea 15" x4 ea   Levator stretch  15"x4 15" x4 ea 15" x4 ea 15" x4 ea                   Ther Activity                                                Modalities

## 2023-08-11 ENCOUNTER — OFFICE VISIT (OUTPATIENT)
Dept: PHYSICAL THERAPY | Facility: CLINIC | Age: 33
End: 2023-08-11
Payer: COMMERCIAL

## 2023-08-11 DIAGNOSIS — G89.29 CHRONIC MIDLINE LOW BACK PAIN WITHOUT SCIATICA: ICD-10-CM

## 2023-08-11 DIAGNOSIS — M54.50 CHRONIC MIDLINE LOW BACK PAIN WITHOUT SCIATICA: ICD-10-CM

## 2023-08-11 DIAGNOSIS — M54.6 CHRONIC MIDLINE THORACIC BACK PAIN: Primary | ICD-10-CM

## 2023-08-11 DIAGNOSIS — G89.29 CHRONIC MIDLINE THORACIC BACK PAIN: Primary | ICD-10-CM

## 2023-08-11 PROCEDURE — 97112 NEUROMUSCULAR REEDUCATION: CPT

## 2023-08-11 PROCEDURE — 97110 THERAPEUTIC EXERCISES: CPT

## 2023-08-11 NOTE — PROGRESS NOTES
Daily Note     Today's date: 2023  Patient name: Navneet Richard  : 1990  MRN: [id number]  Referring provider: TITUS Nion  Dx:   Encounter Diagnosis     ICD-10-CM    1. Chronic midline thoracic back pain  M54.6     G89.29       2. Chronic midline low back pain without sciatica  M54.50     G89.29                      Subjective: Pt reported that she was tired prior to PT. She wakes up stiff, but feels better by the afternoon. Objective: See treatment diary below      Assessment: Tolerated treatment well. Patient demonstrated fatigue post treatment, exhibited good technique with therapeutic exercises and would benefit from continued PT      Plan: Continue per plan of care. Progress treatment as tolerated.        Precautions: none  POC exp 23  HEP Access Code: Earnie Medici - provided to patient at  (23)  Manuals    PA mobs to thoracic spine Resume NV  Gr IV  KG Resume NV Gr IV  KG   Thoracic extension MWM seated        PA mobs to lumbar spine Resume NV  Gr IV  KG Resume NV Gr IV  KG   B/l UT and levator scap stretch  KL, cervical thoraic lumbar ROM, STM KG  TM    Neuro Re-Ed        Seated chin tucks Seated  5" 2x10 5" x10  Seated  5" 2x10 Seated  5" 2x10   Scap retraction Seated  5" 2x10 Seated  5" x10  Seated  5" 2x10 Seated  5" 2x10   PPT Supine 5" x20 Supine 3"x10 Supine  5" x20 Supine  5" x20 Supine 5" x20   Supine abdominal bracing with pball Red pball  5" x20  Red pball  5" x20 Red pball  5" x20 Red pball  5" x20   Bridges 5" 2x10  5" 2x10 5" 2x10 5" 2x10   Prone ITY 3" x15  3" 1x10 ea 3" 1x10 ea 3" 1x10   Prone swimmers NV  3" 1x10 ea 3" 1x10 ea Resume NV   TB rows w/cues for scap retract and TrAb bracing Green TB  5" x20    Green TB  5" x20   TB pull downs w/cues for scap retract and TrAb bracing Green TB  5" x20    Green TB  5" x20           Ther Ex        Upright bike for strength and endurance        UBE for strength 120 rpm x8 mins alt fwd/retro 120 rpm x6 mins alt fwd/retro 120 rpm x6 mins alt fwd/retro 120 rpm x6 mins alt fwd/retro   HS stretch Supine  30"x3 ea Supine  30"x3 ea  Supine  30"x3 ea Supine  30"x3 ea   Piriformis stretch Supine 30"x3 ea  Supine  30"x3 ea Supine  30"x3 ea Supine 30"x3 ea   Thoracic extension over chair W/towel roll  5" x10 W/towel roll  5" x10   W/towel roll  5" x10   UT stretch 15" x4 ea 15"x4 15" x4 ea 15" x4 ea 15" x4 ea   Levator stretch 15" x4 ea 15"x4 15" x4 ea 15" x4 ea 15" x4 ea                   Ther Activity                                                Modalities

## 2023-08-14 ENCOUNTER — APPOINTMENT (OUTPATIENT)
Dept: PHYSICAL THERAPY | Facility: CLINIC | Age: 33
End: 2023-08-14
Payer: COMMERCIAL

## 2023-08-18 ENCOUNTER — OFFICE VISIT (OUTPATIENT)
Dept: PHYSICAL THERAPY | Facility: CLINIC | Age: 33
End: 2023-08-18
Payer: COMMERCIAL

## 2023-08-18 DIAGNOSIS — M54.6 CHRONIC MIDLINE THORACIC BACK PAIN: Primary | ICD-10-CM

## 2023-08-18 DIAGNOSIS — G89.29 CHRONIC MIDLINE THORACIC BACK PAIN: Primary | ICD-10-CM

## 2023-08-18 DIAGNOSIS — M54.50 CHRONIC MIDLINE LOW BACK PAIN WITHOUT SCIATICA: ICD-10-CM

## 2023-08-18 DIAGNOSIS — G89.29 CHRONIC MIDLINE LOW BACK PAIN WITHOUT SCIATICA: ICD-10-CM

## 2023-08-18 PROCEDURE — 97140 MANUAL THERAPY 1/> REGIONS: CPT | Performed by: PHYSICAL THERAPIST

## 2023-08-18 PROCEDURE — 97110 THERAPEUTIC EXERCISES: CPT

## 2023-08-18 PROCEDURE — 97112 NEUROMUSCULAR REEDUCATION: CPT | Performed by: PHYSICAL THERAPIST

## 2023-08-21 ENCOUNTER — OFFICE VISIT (OUTPATIENT)
Dept: PHYSICAL THERAPY | Facility: CLINIC | Age: 33
End: 2023-08-21
Payer: COMMERCIAL

## 2023-08-21 DIAGNOSIS — M54.6 CHRONIC MIDLINE THORACIC BACK PAIN: Primary | ICD-10-CM

## 2023-08-21 DIAGNOSIS — G89.29 CHRONIC MIDLINE THORACIC BACK PAIN: Primary | ICD-10-CM

## 2023-08-21 DIAGNOSIS — M54.50 CHRONIC MIDLINE LOW BACK PAIN WITHOUT SCIATICA: ICD-10-CM

## 2023-08-21 DIAGNOSIS — G89.29 CHRONIC MIDLINE LOW BACK PAIN WITHOUT SCIATICA: ICD-10-CM

## 2023-08-21 PROCEDURE — 97112 NEUROMUSCULAR REEDUCATION: CPT

## 2023-08-21 PROCEDURE — 97110 THERAPEUTIC EXERCISES: CPT

## 2023-08-21 NOTE — PROGRESS NOTES
Daily Note     Today's date: 2023  Patient name: Jeimy Kiran  : 1990  MRN: [id number]  Referring provider: TITUS Barbosa  Dx:   Encounter Diagnosis     ICD-10-CM    1. Chronic midline thoracic back pain  M54.6     G89.29       2. Chronic midline low back pain without sciatica  M54.50     G89.29                      Subjective: Pt reported that her lumbar spine was very sore prior to PT. Objective: See treatment diary below      Assessment: Increased resistance of TB during multiple exercises to facilitate strength. Tolerated treatment well. PA mobs performed by KG. Patient demonstrated fatigue post treatment, exhibited good technique with therapeutic exercises and would benefit from continued PT      Plan: Continue per plan of care. Progress treatment as tolerated.        Precautions: none  POC exp 23  HEP Access Code: Lena Joya - provided to patient at IE (23)  Manuals    PA mobs to thoracic spine Resume NV Gr IV  KG Gr IV  KG Resume NV Gr IV  KG   Thoracic extension MWM seated        PA mobs to lumbar spine Resume NV Gr IV  KG Gr IV  KG Resume NV Gr IV  KG   B/l UT and levator scap stretch    TM    Neuro Re-Ed        Seated chin tucks Seated  5" 2x10 Seated   5" 2x10 Seated   5" 2x10 Seated  5" 2x10 Seated  5" 2x10   Scap retraction Seated  5" 2x10 Seated   5" 2x10 Seated   5" 2x10 Seated  5" 2x10 Seated  5" 2x10   PPT Supine 5" x20   Supine  5" x20 Supine 5" x20   Supine abdominal bracing with pball Red pball  5" x20   Red pball  5" x20 Red pball  5" x20   Bridges 5" 2x10 5" x20 w/black TB around knees 5" x20 w/black TB around knees 5" 2x10 5" 2x10   Prone ITY 3" x15 3" x15 3" x15 ea 3" 1x10 ea 3" 1x10   Prone swimmers NV 3" x15 ea 3" x15 ea 3" 1x10 ea Resume NV   TB rows w/cues for scap retract and TrAb bracing Green TB  5" x20 Green TB  5" x20 Blue TB  5" x20  Green TB  5" x20   TB pull downs w/cues for scap retract and TrAb bracing Green TB  5" x20 Green TB  5" x20 Blue TB  5" x20  Green TB  5" x20           Ther Ex        Upright bike for strength and endurance        UBE for strength 120 rpm x8 mins alt fwd/retro 120 rpm x8 mins alt fwd/retro 100 rpm x8 mins alt fwd/retro 120 rpm x6 mins alt fwd/retro 120 rpm x6 mins alt fwd/retro   HS stretch Supine  30"x3 ea Standing  30"x3 ea Standing  30"x3 ea Supine  30"x3 ea Supine  30"x3 ea   Piriformis stretch Supine 30"x3 ea Supine  30"x3 ea Supine  30"x3 ea Supine  30"x3 ea Supine 30"x3 ea   Thoracic extension over chair W/towel roll  5" x10 W/towel roll  5" x10 W/towel roll  5" x20  W/towel roll  5" x10   UT stretch 15" x4 ea 15" x4 ea 15" x4 ea 15" x4 ea 15" x4 ea   Levator stretch 15" x4 ea 15" x4 ea  15" x4 ea 15" x4 ea 15" x4 ea                   Ther Activity                                                Modalities

## 2023-08-25 ENCOUNTER — EVALUATION (OUTPATIENT)
Dept: PHYSICAL THERAPY | Facility: CLINIC | Age: 33
End: 2023-08-25
Payer: COMMERCIAL

## 2023-08-25 DIAGNOSIS — M54.6 CHRONIC MIDLINE THORACIC BACK PAIN: Primary | ICD-10-CM

## 2023-08-25 DIAGNOSIS — G89.29 CHRONIC MIDLINE LOW BACK PAIN WITHOUT SCIATICA: ICD-10-CM

## 2023-08-25 DIAGNOSIS — G89.29 CHRONIC MIDLINE THORACIC BACK PAIN: Primary | ICD-10-CM

## 2023-08-25 DIAGNOSIS — M54.50 CHRONIC MIDLINE LOW BACK PAIN WITHOUT SCIATICA: ICD-10-CM

## 2023-08-25 PROCEDURE — 97110 THERAPEUTIC EXERCISES: CPT | Performed by: PHYSICAL THERAPIST

## 2023-08-25 PROCEDURE — 97112 NEUROMUSCULAR REEDUCATION: CPT | Performed by: PHYSICAL THERAPIST

## 2023-08-25 PROCEDURE — 97140 MANUAL THERAPY 1/> REGIONS: CPT | Performed by: PHYSICAL THERAPIST

## 2023-08-25 NOTE — PROGRESS NOTES
PT Re-Evaluation  and PT Discharge    Today's date: 2023  Patient name: Carey Diaz  : 1990  MRN: [id number]  Referring provider: TITUS Mahan  Dx:   Encounter Diagnosis     ICD-10-CM    1. Chronic midline thoracic back pain  M54.6     G89.29       2. Chronic midline low back pain without sciatica  M54.50     G89.29                      Assessment  Assessment details: Carey Diaz has been compliant with PT services. She has attended a total of 9 visits of OPPT. She has demonstrated increased strength, increased range of motion, and increased activity tolerance since starting physical therapy services. She reports an overall improvement of 50% thus far. She demonstrates independence with her HEP. At this time, patient has achieved their maximum functional benefit from skilled physical therapy services and will be discharged to their HEP. Patient is in agreement with the plan of care. As a result, patient is discharged from physical therapy. Thank you for the referral!  Understanding of Dx/Px/POC: good   Prognosis: good    Goals  STGs  1) In 4 weeks patient will report 3 points reduced pain - partially met  2) In 4 weeks patient will demonstrate full cervical ROM in all directions without pulling/pain- Met without pain, "stretching" at end ranges  3) In 4 weeks patient will demonstrate 10 deg improved flexibility in b/l HS - MET  4) IN 4 weeks patient will demonstrate independence with HEP    LTGs  1) In 4-6 weeks patient will demonstrate improved postural awareness with sitting and standing - MET  2) In 4-6 weeks patient will report pain levels no greater than 3/10. - not met  3) In 4-6 weeks patient will demonstrate independence with extensive HEP and readiness to transition to fitness facility independently.   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  -pain is intermittent  -denies pain that radiates into the limbs  -pain is localized to upper thoracic spine and lower back  -denies N/T   -denies headaches  -symptoms are aggravated with bending, lifting, prolonged sitting, prolonged standing  -pain is improved with sidelying on either side  -has never had any other treatment for this condition, including PT    Re-evaluation 23:  -pain is still present but patient knows what to do when she has pain  -reports 50% improvement with PT  -feels comfortable with her HEP  Patient Goals  Patient goals for therapy: decreased pain and increased strength  Patient goal: improve posture, learn a home/gym program  Pain  Current pain rating: 3  At best pain ratin  At worst pain ratin    Social Support    Employment status: not working (student in nursing school)    Diagnostic Tests  No diagnostic tests performed        Objective     Concurrent Complaints  Negative for night pain, disturbed sleep, dizziness, faints, headaches, nausea/motion sickness, tinnitus, trouble swallowing, difficulty breathing, shortness of breath, respiratory pain, visual change, bladder dysfunction, bowel dysfunction and saddle (S4) numbness    Palpation   Left   Tenderness of the levator scapulae, sternocleidomastoid and upper trapezius. Right   Tenderness of the levator scapulae and upper trapezius.      Neurological Testing     Sensation   Cervical/Thoracic   Left   Intact: light touch    Right   Intact: light touch    Lumbar   Left   Intact: light touch    Right   Intact: light touch    Reflexes   Left   Biceps (C5/C6): normal (2+)  Brachioradialis (C6): normal (2+)  Triceps (C7): normal (2+)  Patellar (L4): normal (2+)  Achilles (S1): normal (2+)  Babinski sign: negative  Clonus sign: negative    Right   Biceps (C5/C6): normal (2+)  Brachioradialis (C6): normal (2+)  Triceps (C7): normal (2+)  Patellar (L4): normal (2+)  Achilles (S1): normal (2+)  Babinski sign: negative  Clonus sign: negative    Active Range of Motion   Cervical/Thoracic Spine       Cervical    Flexion:  WFL  Extension:  WFL  Left lateral flexion:  WFL  Right lateral flexion:  WFL  Left rotation:  WFL  Right rotation:  [facility]    Thoracic    Flexion:  WFL  Extension:  with pain Restriction level: moderate  Left rotation:  Restriction level: minimal  Right rotation:  with pain Restriction level: minimal    Lumbar   Flexion:  Restriction level: minimal  Extension:  Restriction level: minimal  Left lateral flexion:  WFL  Right lateral flexion:  WFL  Left rotation:  Restriction level: moderate  Right rotation:  Restriction level: moderate  Mechanical Assessment    Cervical      Thoracic    Seated extension: repeated movements  Pain location: no change    Lumbar    Standing flexion: repeated movements   Pain location:peripheralized  Pain intensity: worse  Pain level: increased  Standing extension: repeated movements  Pain location: centralized  Pain intensity: worse  Pain level: increased    Strength/Myotome Testing   Cervical Spine     Left   Normal strength    Right   Normal strength    Lumbar   Left   Normal strength    Right   Normal strength    Tests     Lumbar     Left   Negative crossed SLR and passive SLR. Right   Negative crossed SLR and passive SLR. Left Pelvic Girdle/Sacrum   Negative: active SLR test.     Right Pelvic Girdle/Sacrum   Negative: active SLR test.     Left Hip   Negative JUANA and FADIR. Right Hip   Negative JUANA and FADIR.    Neuro Exam:     Headaches   Patient reports headaches: No.              Precautions: none  POC exp 9/5/23  HEP Access Code: Josemanuel Dai - updated 8/25/23  Manuals 8/11 8/18 8/21 8/25 8/8   PA mobs to thoracic spine Resume NV Gr IV  KG Gr IV  KG Gr IV  KG Gr IV  KG   Thoracic extension MWM seated        PA mobs to lumbar spine Resume NV Gr IV  KG Gr IV  KG Gr IV  KG Gr IV  KG   B/l UT and levator scap stretch        Neuro Re-Ed        Seated chin tucks Seated  5" 2x10 Seated   5" 2x10 Seated   5" 2x10 reviewed for HEP Seated  5" 2x10   Scap retraction Seated  5" 2x10 Seated   5" 2x10 Seated   5" 2x10  Seated  5" 2x10   PPT Supine 5" x20    Supine 5" x20   Supine abdominal bracing with pball Red pball  5" x20    Red pball  5" x20   Bridges 5" 2x10 5" x20 w/black TB around knees 5" x20 w/black TB around knees 5" x20 w/black TB around knees 5" 2x10   Prone ITY 3" x15 3" x15 3" x15 ea 3" x15 ea 3" 1x10   Prone swimmers NV 3" x15 ea 3" x15 ea 3" x15 ea Resume NV   TB rows w/cues for scap retract and TrAb bracing Green TB  5" x20 Green TB  5" x20 Blue TB  5" x20 Blue TB  5" x20 Green TB  5" x20   TB pull downs w/cues for scap retract and TrAb bracing Green TB  5" x20 Green TB  5" x20 Blue TB  5" x20 Blue TB  5" x20 Green TB  5" x20           Ther Ex        Upright bike for strength and endurance        UBE for strength 120 rpm x8 mins alt fwd/retro 120 rpm x8 mins alt fwd/retro 100 rpm x8 mins alt fwd/retro 120 rpm  x10 mins  Alt fwd/retro 120 rpm x6 mins alt fwd/retro   HS stretch Supine  30"x3 ea Standing  30"x3 ea Standing  30"x3 ea reviewed for HEP Supine  30"x3 ea   Piriformis stretch Supine 30"x3 ea Supine  30"x3 ea Supine  30"x3 ea reviewed for HEP Supine 30"x3 ea   Thoracic extension over chair W/towel roll  5" x10 W/towel roll  5" x10 W/towel roll  5" x20 reviewed for HEP W/towel roll  5" x10   UT stretch 15" x4 ea 15" x4 ea 15" x4 ea reviewed for HEP 15" x4 ea   Levator stretch 15" x4 ea 15" x4 ea  15" x4 ea reviewed for HEP 15" x4 ea                   Ther Activity                                                Modalities

## 2023-11-03 ENCOUNTER — APPOINTMENT (EMERGENCY)
Dept: CT IMAGING | Facility: HOSPITAL | Age: 33
End: 2023-11-03
Payer: COMMERCIAL

## 2023-11-03 ENCOUNTER — HOSPITAL ENCOUNTER (EMERGENCY)
Facility: HOSPITAL | Age: 33
Discharge: HOME | End: 2023-11-03
Attending: EMERGENCY MEDICINE
Payer: COMMERCIAL

## 2023-11-03 ENCOUNTER — APPOINTMENT (EMERGENCY)
Dept: RADIOLOGY | Facility: HOSPITAL | Age: 33
End: 2023-11-03
Payer: COMMERCIAL

## 2023-11-03 VITALS
RESPIRATION RATE: 18 BRPM | DIASTOLIC BLOOD PRESSURE: 68 MMHG | WEIGHT: 130 LBS | BODY MASS INDEX: 22.85 KG/M2 | OXYGEN SATURATION: 97 % | HEART RATE: 82 BPM | TEMPERATURE: 98.8 F | SYSTOLIC BLOOD PRESSURE: 118 MMHG

## 2023-11-03 DIAGNOSIS — G43.B0 OPHTHALMOPLEGIC MIGRAINE, NOT INTRACTABLE: ICD-10-CM

## 2023-11-03 DIAGNOSIS — R29.90 STROKE-LIKE SYMPTOMS: Primary | ICD-10-CM

## 2023-11-03 PROBLEM — G43.109 MIGRAINE WITH AURA AND WITHOUT STATUS MIGRAINOSUS, NOT INTRACTABLE: Status: ACTIVE | Noted: 2023-11-03

## 2023-11-03 LAB
ANION GAP SERPL CALCULATED.3IONS-SCNC: 8 MMOL/L
APTT PPP: 29 SECONDS (ref 23–37)
BUN SERPL-MCNC: 6 MG/DL (ref 5–25)
CALCIUM SERPL-MCNC: 9.9 MG/DL (ref 8.4–10.2)
CARDIAC TROPONIN I PNL SERPL HS: <2 NG/L
CHLORIDE SERPL-SCNC: 99 MMOL/L (ref 96–108)
CO2 SERPL-SCNC: 27 MMOL/L (ref 21–32)
CREAT SERPL-MCNC: 0.61 MG/DL (ref 0.6–1.3)
ERYTHROCYTE [DISTWIDTH] IN BLOOD BY AUTOMATED COUNT: 12.3 % (ref 11.6–15.1)
GFR SERPL CREATININE-BSD FRML MDRD: 117 ML/MIN/1.73SQ M
GLUCOSE SERPL-MCNC: 103 MG/DL (ref 65–140)
GLUCOSE SERPL-MCNC: 111 MG/DL (ref 65–140)
HCT VFR BLD AUTO: 43.3 % (ref 34.8–46.1)
HGB BLD-MCNC: 14.2 G/DL (ref 11.5–15.4)
INR PPP: 0.97 (ref 0.84–1.19)
MCH RBC QN AUTO: 29.5 PG (ref 26.8–34.3)
MCHC RBC AUTO-ENTMCNC: 32.8 G/DL (ref 31.4–37.4)
MCV RBC AUTO: 90 FL (ref 82–98)
PLATELET # BLD AUTO: 345 THOUSANDS/UL (ref 149–390)
PMV BLD AUTO: 9.4 FL (ref 8.9–12.7)
POTASSIUM SERPL-SCNC: 3.5 MMOL/L (ref 3.5–5.3)
PROTHROMBIN TIME: 13 SECONDS (ref 11.6–14.5)
RBC # BLD AUTO: 4.81 MILLION/UL (ref 3.81–5.12)
SODIUM SERPL-SCNC: 134 MMOL/L (ref 135–147)
WBC # BLD AUTO: 11.74 THOUSAND/UL (ref 4.31–10.16)

## 2023-11-03 PROCEDURE — 96375 TX/PRO/DX INJ NEW DRUG ADDON: CPT

## 2023-11-03 PROCEDURE — 85730 THROMBOPLASTIN TIME PARTIAL: CPT | Performed by: EMERGENCY MEDICINE

## 2023-11-03 PROCEDURE — 85610 PROTHROMBIN TIME: CPT | Performed by: EMERGENCY MEDICINE

## 2023-11-03 PROCEDURE — 80048 BASIC METABOLIC PNL TOTAL CA: CPT | Performed by: EMERGENCY MEDICINE

## 2023-11-03 PROCEDURE — 93005 ELECTROCARDIOGRAM TRACING: CPT

## 2023-11-03 PROCEDURE — 71045 X-RAY EXAM CHEST 1 VIEW: CPT

## 2023-11-03 PROCEDURE — 82948 REAGENT STRIP/BLOOD GLUCOSE: CPT

## 2023-11-03 PROCEDURE — 36415 COLL VENOUS BLD VENIPUNCTURE: CPT | Performed by: EMERGENCY MEDICINE

## 2023-11-03 PROCEDURE — 70498 CT ANGIOGRAPHY NECK: CPT

## 2023-11-03 PROCEDURE — 84484 ASSAY OF TROPONIN QUANT: CPT | Performed by: EMERGENCY MEDICINE

## 2023-11-03 PROCEDURE — 70496 CT ANGIOGRAPHY HEAD: CPT

## 2023-11-03 PROCEDURE — 96365 THER/PROPH/DIAG IV INF INIT: CPT

## 2023-11-03 PROCEDURE — 99283 EMERGENCY DEPT VISIT LOW MDM: CPT | Performed by: PSYCHIATRY & NEUROLOGY

## 2023-11-03 PROCEDURE — 99284 EMERGENCY DEPT VISIT MOD MDM: CPT

## 2023-11-03 PROCEDURE — 85027 COMPLETE CBC AUTOMATED: CPT | Performed by: EMERGENCY MEDICINE

## 2023-11-03 PROCEDURE — 99285 EMERGENCY DEPT VISIT HI MDM: CPT | Performed by: EMERGENCY MEDICINE

## 2023-11-03 PROCEDURE — 96367 TX/PROPH/DG ADDL SEQ IV INF: CPT

## 2023-11-03 RX ORDER — METOCLOPRAMIDE HYDROCHLORIDE 5 MG/ML
10 INJECTION INTRAMUSCULAR; INTRAVENOUS ONCE
Status: COMPLETED | OUTPATIENT
Start: 2023-11-03 | End: 2023-11-03

## 2023-11-03 RX ORDER — MAGNESIUM SULFATE HEPTAHYDRATE 40 MG/ML
2 INJECTION, SOLUTION INTRAVENOUS ONCE
Status: COMPLETED | OUTPATIENT
Start: 2023-11-03 | End: 2023-11-03

## 2023-11-03 RX ORDER — ACETAMINOPHEN 10 MG/ML
1000 INJECTION, SOLUTION INTRAVENOUS ONCE
Status: COMPLETED | OUTPATIENT
Start: 2023-11-03 | End: 2023-11-03

## 2023-11-03 RX ORDER — DIPHENHYDRAMINE HYDROCHLORIDE 50 MG/ML
25 INJECTION INTRAMUSCULAR; INTRAVENOUS ONCE
Status: COMPLETED | OUTPATIENT
Start: 2023-11-03 | End: 2023-11-03

## 2023-11-03 RX ADMIN — DIPHENHYDRAMINE HYDROCHLORIDE 25 MG: 50 INJECTION, SOLUTION INTRAMUSCULAR; INTRAVENOUS at 14:22

## 2023-11-03 RX ADMIN — MAGNESIUM SULFATE HEPTAHYDRATE 2 G: 40 INJECTION, SOLUTION INTRAVENOUS at 14:23

## 2023-11-03 RX ADMIN — SODIUM CHLORIDE 1000 ML: 0.9 INJECTION, SOLUTION INTRAVENOUS at 14:31

## 2023-11-03 RX ADMIN — METOCLOPRAMIDE 10 MG: 5 INJECTION, SOLUTION INTRAMUSCULAR; INTRAVENOUS at 14:22

## 2023-11-03 RX ADMIN — IOHEXOL 85 ML: 350 INJECTION, SOLUTION INTRAVENOUS at 13:14

## 2023-11-03 RX ADMIN — ACETAMINOPHEN 1000 MG: 10 INJECTION INTRAVENOUS at 15:02

## 2023-11-03 NOTE — ED PROVIDER NOTES
History  Chief Complaint   Patient presents with    Blurred Vision     Patient reports she woke up this morning and felt fine. While driving her son to school she developed blurry vision in the right eye and developed a headache. Patient reports she took motrin for the headache with no relief. Patient reports similar symptoms in 2017 and was diagnosed with a migraine with aura. 55-year-old female presenting with reports of headache with visual disturbance in the right eye and slurred speech which started 2 hours prior to arrival.  Patient does state that she had a history of complex migraines most recently 6 years ago. States that she frequently gets migraines about once a month but does not usually have these symptoms other than 1 time a few years ago. Patient states she went home and took some ibuprofen which did help with the headache and her symptoms have somewhat improved since then. No longer reporting slurred speech and states that her vision is nearly back to baseline at this time. Denies any other symptoms or issues at this time. Prior to Admission Medications   Prescriptions Last Dose Informant Patient Reported? Taking? Jencycla 0.35 MG tablet   Yes No   Sig: Take 1 tablet by mouth daily      Facility-Administered Medications: None       Past Medical History:   Diagnosis Date    Allergic     Dust allergy     Gestational diabetes     Shellfish allergy        History reviewed. No pertinent surgical history. Family History   Problem Relation Age of Onset    No Known Problems Mother     No Known Problems Father      I have reviewed and agree with the history as documented. E-Cigarette/Vaping     E-Cigarette/Vaping Substances     Social History     Tobacco Use    Smoking status: Never    Smokeless tobacco: Never   Substance Use Topics    Alcohol use: Yes     Comment: OCCASIONAL    Drug use: No       Review of Systems   Eyes:  Positive for visual disturbance.    Neurological:  Positive for speech difficulty and headaches. All other systems reviewed and are negative. Physical Exam  Physical Exam  Vitals and nursing note reviewed. Constitutional:       General: She is not in acute distress. Appearance: She is well-developed. She is not diaphoretic. HENT:      Head: Normocephalic and atraumatic. Right Ear: External ear normal.      Left Ear: External ear normal.      Nose: Nose normal.   Eyes:      General: No scleral icterus. Right eye: No discharge. Left eye: No discharge. Conjunctiva/sclera: Conjunctivae normal.      Pupils: Pupils are equal, round, and reactive to light. Neck:      Vascular: No JVD. Trachea: No tracheal deviation. Cardiovascular:      Rate and Rhythm: Normal rate and regular rhythm. Heart sounds: Normal heart sounds. No murmur heard. No friction rub. No gallop. Pulmonary:      Effort: Pulmonary effort is normal. No respiratory distress. Breath sounds: Normal breath sounds. No stridor. No wheezing or rales. Abdominal:      General: Bowel sounds are normal. There is no distension. Palpations: Abdomen is soft. There is no mass. Tenderness: There is no abdominal tenderness. There is no guarding. Musculoskeletal:         General: No tenderness or deformity. Normal range of motion. Cervical back: Normal range of motion and neck supple. Skin:     General: Skin is warm and dry. Coloration: Skin is not pale. Findings: No erythema or rash. Neurological:      Mental Status: She is alert and oriented to person, place, and time. Cranial Nerves: No cranial nerve deficit. Sensory: No sensory deficit. Motor: No abnormal muscle tone.       Comments: 5/5 strength x 4 extremities  Gross sensation intact  CN intact  No Dysmetria or Dysdiadochokinesia  GCS 15  No pronator drift    Reports of some blurred vision in R lateral visual field of R eye however still able to see  No other visual changes     Psychiatric:         Behavior: Behavior normal.         Thought Content:  Thought content normal.         Judgment: Judgment normal.         Vital Signs  ED Triage Vitals   Temperature Pulse Respirations Blood Pressure SpO2   11/03/23 1244 11/03/23 1244 11/03/23 1244 11/03/23 1244 11/03/23 1244   98.8 °F (37.1 °C) 83 18 151/84 99 %      Temp src Heart Rate Source Patient Position - Orthostatic VS BP Location FiO2 (%)   -- 11/03/23 1320 11/03/23 1320 -- --    Monitor Sitting        Pain Score       11/03/23 1244       4           Vitals:    11/03/23 1320 11/03/23 1330 11/03/23 1345 11/03/23 1400   BP: 129/67 131/75 137/73 118/68   Pulse: 91 83 85 82   Patient Position - Orthostatic VS: Sitting Sitting  Sitting         Visual Acuity  Visual Acuity      Flowsheet Row Most Recent Value   L Pupil Size (mm) 3   R Pupil Size (mm) 3            ED Medications  Medications   sodium chloride 0.9 % bolus 1,000 mL (1,000 mL Intravenous New Bag 11/3/23 1431)   iohexol (OMNIPAQUE) 350 MG/ML injection (MULTI-DOSE) 85 mL (85 mL Intravenous Given 11/3/23 1314)   magnesium sulfate 2 g/50 mL IVPB (premix) 2 g (0 g Intravenous Stopped 11/3/23 1453)   acetaminophen (Ofirmev) injection 1,000 mg (1,000 mg Intravenous New Bag 11/3/23 1502)   metoclopramide (REGLAN) injection 10 mg (10 mg Intravenous Given 11/3/23 1422)   diphenhydrAMINE (BENADRYL) injection 25 mg (25 mg Intravenous Given 11/3/23 1422)       Diagnostic Studies  Results Reviewed       Procedure Component Value Units Date/Time    HS Troponin I 4hr [[id number]]     Lab Status: No result Specimen: Blood     HS Troponin I 2hr [[id number]]     Lab Status: No result Specimen: Blood     HS Troponin 0hr (reflex protocol) [[id number]]  (Normal) Collected: 11/03/23 1300    Lab Status: Final result Specimen: Blood from Arm, Right Updated: 11/03/23 1329     hs TnI 0hr <2 ng/L     Basic metabolic panel [[id number]]  (Abnormal) Collected: 11/03/23 1300    Lab Status: Final result Specimen: Blood from Arm, Right Updated: 11/03/23 1324     Sodium 134 mmol/L      Potassium 3.5 mmol/L      Chloride 99 mmol/L      CO2 27 mmol/L      ANION GAP 8 mmol/L      BUN 6 mg/dL      Creatinine 0.61 mg/dL      Glucose 103 mg/dL      Calcium 9.9 mg/dL      eGFR 117 ml/min/1.73sq m     Narrative:      [facility] guidelines for Chronic Kidney Disease (CKD):     Stage 1 with normal or high GFR (GFR > 90 mL/min/1.73 square meters)    Stage 2 Mild CKD (GFR = 60-89 mL/min/1.73 square meters)    Stage 3A Moderate CKD (GFR = 45-59 mL/min/1.73 square meters)    Stage 3B Moderate CKD (GFR = 30-44 mL/min/1.73 square meters)    Stage 4 Severe CKD (GFR = 15-29 mL/min/1.73 square meters)    Stage 5 End Stage CKD (GFR <15 mL/min/1.73 square meters)  Note: GFR calculation is accurate only with a steady state creatinine    Protime-INR [[id number]]  (Normal) Collected: 11/03/23 1300    Lab Status: Final result Specimen: Blood from Arm, Right Updated: 11/03/23 1319     Protime 13.0 seconds      INR 0.97    APTT [[id number]]  (Normal) Collected: 11/03/23 1300    Lab Status: Final result Specimen: Blood from Arm, Right Updated: 11/03/23 1319     PTT 29 seconds     CBC and Platelet [[id number]]  (Abnormal) Collected: 11/03/23 1300    Lab Status: Final result Specimen: Blood from Arm, Right Updated: 11/03/23 1318     WBC 11.74 Thousand/uL      RBC 4.81 Million/uL      Hemoglobin 14.2 g/dL      Hematocrit 43.3 %      MCV 90 fL      MCH 29.5 pg      MCHC 32.8 g/dL      RDW 12.3 %      Platelets 025 Thousands/uL      MPV 9.4 fL     Fingerstick Glucose (POCT) [[id number]]  (Normal) Collected: 11/03/23 1257    Lab Status: Final result Updated: 11/03/23 1302     POC Glucose 111 mg/dl                    X-ray chest 1 view portable   Final Result by Gwenetta Klinefelter, MD (11/03 1343)      No acute cardiopulmonary disease.                   Workstation performed: [id number]         CTA stroke alert (head/neck)   Final Result by Andre Doss MD (11/03 1342)   1. CTA head: Negative for large vessel intracranial occlusion or hemodynamically significant stenosis. 2. CTA neck:  No extracranial carotid stenosis. The cervical vertebral arteries are patent. Findings were directly discussed with Dr. Parminder Plaza at 1:40 p.m. Workstation performed: [id number]         CT stroke alert brain   Final Result by Andre Doss MD (11/03 1343)      No acute intracranial abnormality. Findings were directly discussed with Dr. Parminder Plaza at 1:40 p.m. Workstation performed: [id number]                    Procedures  ECG 12 Lead Documentation Only    Date/Time: 11/3/2023 3:20 PM    Performed by: Julissa Vargas DO  Authorized by: Julissa Vargas DO    Interpretation:     Interpretation: normal    Rate:     ECG rate:  92    ECG rate assessment: normal    Rhythm:     Rhythm: sinus rhythm    Ectopy:     Ectopy: none    QRS:     QRS axis:  Normal    QRS intervals:  Normal  Conduction:     Conduction: normal    ST segments:     ST segments:  Normal  T waves:     T waves: normal             ED Course  ED Course as of 11/03/23 1539   Fri Nov 03, 2023   1329 hs TnI 0hr: <2   1412 Case discussed with neurology recommending treatment for migraine minus Toradol. 1452 Patient still with mild headache but with improvement in symptoms at this time. 1537 Patient feeling better at this time with resolution of symptoms with like to go home.              HEART Risk Score      Flowsheet Row Most Recent Value   Heart Score Risk Calculator    History 0 Filed at: 11/03/2023 1539   ECG 0 Filed at: 11/03/2023 1539   Age 0 Filed at: 11/03/2023 1539   Risk Factors 0 Filed at: 11/03/2023 1539   Troponin 0 Filed at: 11/03/2023 1539   HEART Score 0 Filed at: 11/03/2023 429 hospitals             Stroke Assessment       Row Name 11/03/23 1259             NIH Stroke Scale    Interval Baseline      Level of Consciousness (1a.) 0      LOC Questions (1b.) 0      LOC Commands (1c.) 0      Best Gaze (2.) 0      Visual (3.) 0      Facial Palsy (4.) 0      Motor Arm, Left (5a.) 0      Motor Arm, Right (5b.) 0      Motor Leg, Left (6a.) 0      Motor Leg, Right (6b.) 0      Limb Ataxia (7.) 0      Sensory (8.) 0      Best Language (9.) 0      Dysarthria (10.) 0      Extinction and Inattention (11.) (Formerly Neglect) 0      Total 0                                SBIRT 22yo+      Flowsheet Row Most Recent Value   Initial Alcohol Screen: US AUDIT-C     1. How often do you have a drink containing alcohol? 0 Filed at: 11/03/2023 1243   2. How many drinks containing alcohol do you have on a typical day you are drinking? 0 Filed at: 11/03/2023 1243   3a. Male UNDER 65: How often do you have five or more drinks on one occasion? 0 Filed at: 11/03/2023 1243   3b. FEMALE Any Age, or MALE 65+: How often do you have 4 or more drinks on one occassion? 0 Filed at: 11/03/2023 1243   Audit-C Score 0 Filed at: 11/03/2023 1243   CHANELLE: How many times in the past year have you. .. Used an illegal drug or used a prescription medication for non-medical reasons? Never Filed at: 11/03/2023 1243                      Medical Decision Making  25-year-old female with history of complex migraine presenting with severe headache, visual changes and dysarthria. Initially made stroke alert discussed with neurology stating likely complex migraine. CTs showing no acute abnormalities. No acute abnormalities noted on blood work. Patient treated as complex migraine with resolution of symptoms. Strict return precautions discussed and provided recommend follow-up with primary care which she has not initiated follow-up with. Amount and/or Complexity of Data Reviewed  Labs: ordered. Decision-making details documented in ED Course. Radiology: ordered. Risk  Prescription drug management.              Disposition  Final diagnoses:   Stroke-like symptoms Ophthalmoplegic migraine, not intractable     Time reflects when diagnosis was documented in both MDM as applicable and the Disposition within this note       Time User Action Codes Description Comment    11/3/2023 12:59 PM Hallieford Gallito Add [R29.90] Stroke-like symptoms     11/3/2023  3:39 PM Hallieford Gallito Add [place]. B0] Ophthalmoplegic migraine, not intractable           ED Disposition       ED Disposition   Discharge    Condition   Stable    Date/Time   Fri Nov 3, 2023 [address] discharge to home/self care. Follow-up Information       Follow up With Specialties Details Why Contact Info Additional Rigoberto Guerrero, [address] [facility], Nurse Practitioner   [address] [facility] [address] [phone] [address] [facility] Emergency Medicine Go to  As needed, If symptoms worsen [address] [place] [address]. [address] Kosta Cobian [address] [phone] [address] [facility], [address] [place] [address], [address] [place] [address]            Patient's Medications   Discharge Prescriptions    No medications on file       No discharge procedures on file.     PDMP Review       None            ED Provider  Electronically Signed by             Jeremías Man DO  11/03/23 6639

## 2023-11-03 NOTE — ASSESSMENT & PLAN NOTE
Migraine headache, improving but not resolved. - Recommend migraine cocktail minus Toradol as patient took ibuprofen prior to arrival.  Give Zofran and Benadryl IV.  - No additional neurologic work-up needed at this time. - Discussed with patient and family, agreeable to discharge. - No additional recommendation at this time.   - Please call question/concerns

## 2023-11-03 NOTE — TELEMEDICINE
TeleConsultation - Neurology   Linnette Kim 26 y.o. female MRN: [id number]  Unit/Bed#: ED 15 Encounter: [id number]        REQUIRED DOCUMENTATION:     1. This service was provided via Telemedicine. 2. Provider located at [address] [place] [address]  3. TeleMed provider: Americo Wharton DO.  4. Identify all parties in room with patient during tele consult:  Patient and family  11. Patient was then informed that this was a Telemedicine visit and that the exam was being conducted confidentially over secure lines. My office door was closed. No one else was in the room. Patient acknowledged consent and understanding of privacy and security of the Telemedicine visit, and gave us permission to have the assistant stay in the room in order to assist with the history and to conduct the exam.  I informed the patient that I have reviewed their record in Epic and presented the opportunity for them to ask any questions regarding the visit today. The patient agreed to participate. Assessment/Plan     Migraine with aura and without status migrainosus, not intractable  Assessment & Plan  Migraine headache, improving but not resolved. - Recommend migraine cocktail minus Toradol as patient took ibuprofen prior to arrival.  Give Zofran and Benadryl IV.  - No additional neurologic work-up needed at this time. - Discussed with patient and family, agreeable to discharge. - No additional recommendation at this time. - Please call question/concerns            Linnette Kim will not need outpatient follow up with Neurology. She will not require outpatient neurological testing. History of Present Illness     Reason for Consult / Principal Problem: Headache, blurry vision and dysarthria  Hx and PE limited by: Not applicable  HPI: Linnette Kim is a 26 y.o. right handed female. Migraines who presents with vision loss, headache, dysarthria which is improved.   Patient reports near vision loss in the right eye followed by headache and severe nausea. Patient also reported difficulty focusing and concentrating, remembering names (migraine fog ). Is also reports of some dysarthria which is since resolved. .  The dysarthria has since resolved patient still reports headache and some mild blurry vision on the right. ED reports blurry vision of the lateral visual field right eye. Patient reports a similar if not identical event in the past which resolved and which was diagnosed as a migraine. Called by  regarding stroke alert at 12:58, neuro response was immediate.    - NIHSS 0  - LKW 11 AM    CTH & CTA were unremarkable for any acute pathology, LVO, or other IR target. IV thrombolysis was not given due to nondisabling symptoms and low clinical suspicion for vascular event. Consult to Neurology  Consult performed by: Razia Landers DO  Consult ordered by: Yashira Matias DO           Review of Systems   Constitutional:  Negative for chills and fever. HENT:  Negative for facial swelling and hearing loss. Eyes:  Positive for photophobia. Negative for pain and discharge. Respiratory:  Negative for cough, choking and shortness of breath. Cardiovascular:  Negative for chest pain. Gastrointestinal:  Negative for constipation, diarrhea, nausea and vomiting. Endocrine: Negative for cold intolerance and heat intolerance. Genitourinary:  Negative for difficulty urinating, frequency and urgency. Skin:  Negative for color change and rash. Neurological:  Positive for headaches. Negative for dizziness, facial asymmetry, weakness, light-headedness and numbness. All other systems reviewed and are negative. Historical Information   Past Medical History:   Diagnosis Date    Allergic     Dust allergy     Gestational diabetes     Shellfish allergy      History reviewed. No pertinent surgical history.   Social History   Social History     Substance and Sexual Activity   Alcohol Use Yes    Comment: OCCASIONAL Social History     Substance and Sexual Activity   Drug Use No     E-Cigarette/Vaping     E-Cigarette/Vaping Substances     Social History     Tobacco Use   Smoking Status Never   Smokeless Tobacco Never     Family History: non-contributory    Review of previous medical records was  completed. Meds/Allergies   all current active meds have been reviewed    Allergies   Allergen Reactions    Other      Annotation - [id number]: roaches                                            dust    Shellfish Allergy - Food Allergy        Objective   Vitals:Blood pressure 131/75, pulse 83, temperature 98.8 °F (37.1 °C), resp. rate 18, weight 59 kg (130 lb), SpO2 98 %, not currently breastfeeding. ,Body mass index is 22.85 kg/m². No intake or output data in the 24 hours ending 11/03/23 1349    Invasive Devices: Invasive Devices       Peripheral Intravenous Line  Duration             Peripheral IV 11/03/23 Right Antecubital <1 day                    Physical Exam  Constitutional:       General: She is not in acute distress. Appearance: Normal appearance. She is normal weight. She is not ill-appearing, toxic-appearing or diaphoretic. HENT:      Head: Normocephalic. Right Ear: External ear normal.      Left Ear: External ear normal.   Eyes:      General: No scleral icterus. Right eye: No discharge. Left eye: No discharge. Extraocular Movements: Extraocular movements intact. Conjunctiva/sclera: Conjunctivae normal.      Pupils: Pupils are equal, round, and reactive to light. Pulmonary:      Effort: Pulmonary effort is normal. No respiratory distress. Breath sounds: Normal breath sounds. No stridor. Skin:     Coloration: Skin is not jaundiced or pale. Neurological:      General: No focal deficit present. Mental Status: She is alert and oriented to person, place, and time. Cranial Nerves: No cranial nerve deficit. Sensory: No sensory deficit. Motor: No weakness. Coordination: Coordination normal.   Psychiatric:         Mood and Affect: Mood normal.         Behavior: Behavior normal.         Thought Content: Thought content normal.         Judgment: Judgment normal.       Neurologic Exam     Mental Status   Oriented to person, place, and time. Level of consciousness:  - awake and alert, AAO x3.    Language:  -fluent, comprehension intact,     Visual-spatial:  - no clear signs of hemineglect. -follow commands equally on both sides. -     Cranial Nerves     CN III, IV, VI   Pupils are equal, round, and reactive to light. Pupils are equal and round  Extraocular muscles intact, gaze conjugate. Face appears symmetric with respect to motor. Shoulder shrug is symmetric. Motor Exam Patient moves all 4 extremities equally without drift. Bulk appears normal     Gait, Coordination, and Reflexes  no gross ataxia in any limb. Gait was deferred       Lab Results: I have personally reviewed pertinent reports. Imaging Studies: I have personally reviewed pertinent reports.  , I have personally reviewed pertinent films in PACS, and I have personally reviewed pertinent films in PACS with a Radiologist.  EKG, Pathology, and Other Studies: I have personally reviewed pertinent reports.     VTE Prophylaxis: Sequential compression device Todd Hood     Code Status: No Order  Advance Directive and Living Will:      Power of :    POLST:      Counseling / Coordination of Care  N/A

## 2023-11-04 LAB
ATRIAL RATE: 92 BPM
P AXIS: 58 DEGREES
PR INTERVAL: 176 MS
QRS AXIS: 52 DEGREES
QRSD INTERVAL: 88 MS
QT INTERVAL: 352 MS
QTC INTERVAL: 435 MS
T WAVE AXIS: 64 DEGREES
VENTRICULAR RATE: 92 BPM

## 2023-11-04 PROCEDURE — 93010 ELECTROCARDIOGRAM REPORT: CPT | Performed by: INTERNAL MEDICINE

## 2023-11-06 ENCOUNTER — VBI (OUTPATIENT)
Dept: FAMILY MEDICINE CLINIC | Facility: CLINIC | Age: 33
End: 2023-11-06

## 2023-11-06 NOTE — LETTER
[address]  [address] [place] [address]  [phone]    Date: 11/07/23  Lauren Greer  [address] [facility] [address]    Dear Osmin Cortes: Thank you for choosing [facility] emergency department for care. Your primary care provider wants to make sure that your ongoing medical care is being addressed. If you require follow up care as a result of your emergency department visit, there are a few things the practice would like you to know. As part of the network's continuing commitment to caring for our patients, we have added more same day appointments and have extended office hours to meet your medical needs. After hours, on-call physicians are available via your primary care provider's main office line. We encourage you to contact our office prior to seeking treatment to discuss your symptoms with the medical staff. Together, we can determine the correct course of action. A majority of non-emergent conditions such as: common cold, flu-like symptoms, fevers, strains/sprains, dislocations, minor burns, cuts and animal bites can be treated at [facility]. Diagnostic testing is available at some sites. Of course, if you are experiencing a life threatening medical emergency call 911 or proceed directly to the nearest emergency room.     [facility] is conveniently located at:    [address]  [phone]  SKIP THE WAIT  Conveniently offered at most [facility] locations  [facility] your spot online at [email or web address] or on the [address] [place] [address] Drive    Sincerely,    [address]  Dept: [phone]

## 2023-11-06 NOTE — TELEPHONE ENCOUNTER
11/06/23 2:55 PM    Patient contacted post ED visit, first outreach attempt made. Message was left for patient to return a call to the VBI Department at [facility]: Phone [phone]. MB is full can not leave any messages    Thank you.   Dai Reese  [facility]

## 2023-11-07 NOTE — TELEPHONE ENCOUNTER
11/07/23 9:56 AM    Patient contacted post ED visit, second outreach attempt made. Message was left for patient to return a call to the VBI Department at [facility]: Phone [phone]. MB is full  Thank you.   Juan Manuel Jaramillo  [facility]

## 2023-11-07 NOTE — TELEPHONE ENCOUNTER
11/07/23 3:23 PM    Patient contacted post ED visit, third outreach attempt made. Message was left for patient to return a call to either the VBI Department at [facility]: Phone [phone] or the PCP office. Letter sent to my chart MB Full11/07/23 3:24 PM    Patient contacted post ED visit, phone outreaches were unsuccessful and a MyChart letter has been sent to the patient as follow-up. Thank you. Jeanenne Mortimer  [facility]      Thank you.   Jeanenne Mortimer  [facility]

## 2024-01-02 ENCOUNTER — OFFICE VISIT (OUTPATIENT)
Dept: URGENT CARE | Facility: CLINIC | Age: 34
End: 2024-01-02
Payer: COMMERCIAL

## 2024-01-02 VITALS
SYSTOLIC BLOOD PRESSURE: 100 MMHG | WEIGHT: 140.13 LBS | TEMPERATURE: 98.3 F | DIASTOLIC BLOOD PRESSURE: 64 MMHG | BODY MASS INDEX: 24.63 KG/M2 | HEART RATE: 80 BPM | RESPIRATION RATE: 14 BRPM | OXYGEN SATURATION: 99 %

## 2024-01-02 DIAGNOSIS — R05.1 ACUTE COUGH: ICD-10-CM

## 2024-01-02 DIAGNOSIS — J02.9 SORE THROAT: Primary | ICD-10-CM

## 2024-01-02 LAB — S PYO AG THROAT QL: NEGATIVE

## 2024-01-02 PROCEDURE — 87880 STREP A ASSAY W/OPTIC: CPT | Performed by: PHYSICIAN ASSISTANT

## 2024-01-02 PROCEDURE — 87636 SARSCOV2 & INF A&B AMP PRB: CPT | Performed by: PHYSICIAN ASSISTANT

## 2024-01-02 PROCEDURE — 99213 OFFICE O/P EST LOW 20 MIN: CPT | Performed by: PHYSICIAN ASSISTANT

## 2024-01-02 PROCEDURE — 87070 CULTURE OTHR SPECIMN AEROBIC: CPT | Performed by: PHYSICIAN ASSISTANT

## 2024-01-02 NOTE — PROGRESS NOTES
[facility]    NAME: Benita Meier is a 33 y.o. female  : 1990    MRN: [id number]  DATE: 2024  TIME: 9:26 AM    Assessment and Plan   Sore throat [J02.9]  1. Sore throat  POCT rapid strepA    Throat culture      2. Acute cough  Covid/Flu- Office Collect Normal          Patient Instructions     Patient Instructions   Infection appears viral.  Recommend symptomatic treatment.  Can take ibuprofen or tylenol as needed for pain or fever.  Over the counter cough and cold medications to help with symptoms.  Use salt water gargles for sore throat and throat lozenges.  Cough drops as needed.  Wash hands frequently to prevent the spread of infection.  If not improving over the next 7-10 days, follow up with PCP.  Symptoms may persist for 10-14 days.      Vitamin D3 2000 IU daily  Vitamin C 1g every 12 hours  Multivitamin daily  Fluids and rest      Chief Complaint     Chief Complaint   Patient presents with    Sore Throat     Started 2 days ago, post nasal drip, unable to swallow. Body aches, chills, sinus pressure, no fever. No cough. Runny/stuffy nose. Exposed to family members positive for covid.        History of Present Illness   33-year-old female here with complaint of postnasal drip, sore throat, body aches and chills and fever.  Started 2 days ago.  Exposed to family members that have COVID.        Review of Systems   Review of Systems   Constitutional:  Positive for chills. Negative for appetite change and fever.   HENT:  Positive for congestion, postnasal drip, rhinorrhea and sore throat. Negative for ear discharge, ear pain, facial swelling, sinus pressure and sneezing.    Respiratory:  Negative for cough, shortness of breath and wheezing.    Musculoskeletal:  Positive for myalgias.   Neurological:  Negative for headaches.       Current Medications     Current Outpatient Medications:     Jencycla 0.35 MG tablet, Take 1 tablet by mouth daily (Patient not taking: Reported on 2024),  Disp: , Rfl:     Current Allergies     Allergies as of 01/02/2024 - Reviewed 01/02/2024   Allergen Reaction Noted    Other  06/18/2014    Shellfish allergy - food allergy  06/18/2014          The following portions of the patient's history were reviewed and updated as appropriate: allergies, current medications, past family history, past medical history, past social history, past surgical history and problem list.   Past Medical History:   Diagnosis Date    Allergic     Dust allergy     Gestational diabetes     Shellfish allergy      History reviewed. No pertinent surgical history.  Family History   Problem Relation Age of Onset    No Known Problems Mother     No Known Problems Father      Social History     Socioeconomic History    Marital status: /Civil Union     Spouse name: Not on file    Number of children: Not on file    Years of education: Not on file    Highest education level: Not on file   Occupational History    Not on file   Tobacco Use    Smoking status: Never     Passive exposure: Never    Smokeless tobacco: Never   Vaping Use    Vaping status: Never Used   Substance and Sexual Activity    Alcohol use: Yes     Comment: OCCASIONAL    Drug use: No    Sexual activity: Never   Other Topics Concern    Not on file   Social History Narrative    Not on file     Social Determinants of Health     Financial Resource Strain: Not on file   Food Insecurity: Not on file   Transportation Needs: Not on file   Physical Activity: Not on file   Stress: Not on file   Social Connections: Not on file   Intimate Partner Violence: Not on file   Housing Stability: Not on file     Medications have been verified.    Objective   /64   Pulse 80   Temp 98.3 °F (36.8 °C)   Resp 14   Wt 63.6 kg (140 lb 2 oz)   SpO2 99%   BMI 24.63 kg/m²      Physical Exam   Physical Exam  Vitals and nursing note reviewed.   Constitutional:       General: She is not in acute distress.     Appearance: She is well-developed.   HENT:       Head: Normocephalic and atraumatic.      Right Ear: Tympanic membrane normal.      Left Ear: Tympanic membrane normal.      Nose: Nose normal. No mucosal edema or rhinorrhea.      Right Sinus: No maxillary sinus tenderness or frontal sinus tenderness.      Left Sinus: No maxillary sinus tenderness or frontal sinus tenderness.      Mouth/Throat:      Mouth: Mucous membranes are moist.      Pharynx: Posterior oropharyngeal erythema present. No oropharyngeal exudate.      Tonsils: 1+ on the right. 1+ on the left.   Eyes:      Conjunctiva/sclera: Conjunctivae normal.   Cardiovascular:      Rate and Rhythm: Normal rate and regular rhythm.      Heart sounds: Normal heart sounds. No murmur heard.

## 2024-01-04 LAB — BACTERIA THROAT CULT: NORMAL

## 2024-03-07 ENCOUNTER — TELEPHONE (OUTPATIENT)
Dept: FAMILY MEDICINE CLINIC | Facility: CLINIC | Age: 34
End: 2024-03-07

## 2024-03-07 DIAGNOSIS — R11.10 VOMITING, UNSPECIFIED VOMITING TYPE, UNSPECIFIED WHETHER NAUSEA PRESENT: Primary | ICD-10-CM

## 2024-03-07 RX ORDER — ONDANSETRON 4 MG/1
4 TABLET, FILM COATED ORAL EVERY 8 HOURS PRN
Qty: 20 TABLET | Refills: 0 | Status: SHIPPED | OUTPATIENT
Start: 2024-03-07

## 2024-03-07 NOTE — TELEPHONE ENCOUNTER
Nena, this is Benita Torres, last name JEWELS, date of birth [birth year]. I'm calling just to speak to a healthcare provider. I'm a current patient there. I've been throwing up since about 4:00 AM this morning. I haven't stopped or been able to keep anything down. I was wondering if something can be prescribed for the nausea because concerned with the amount of fluids that I'm losing and not able to hold down the phone number [phone]. Thanks.  You received a voice mail from Anonymous.          Please advise.....